# Patient Record
Sex: MALE | Race: WHITE | NOT HISPANIC OR LATINO | URBAN - METROPOLITAN AREA
[De-identification: names, ages, dates, MRNs, and addresses within clinical notes are randomized per-mention and may not be internally consistent; named-entity substitution may affect disease eponyms.]

---

## 2017-02-20 ENCOUNTER — HISTORICAL (OUTPATIENT)
Dept: ADMINISTRATIVE | Facility: HOSPITAL | Age: 72
End: 2017-02-20

## 2019-05-22 ENCOUNTER — HISTORICAL (OUTPATIENT)
Dept: ADMINISTRATIVE | Facility: HOSPITAL | Age: 74
End: 2019-05-22

## 2020-11-18 ENCOUNTER — HISTORICAL (OUTPATIENT)
Dept: ADMINISTRATIVE | Facility: HOSPITAL | Age: 75
End: 2020-11-18

## 2021-04-05 ENCOUNTER — HISTORICAL (OUTPATIENT)
Dept: ADMINISTRATIVE | Facility: HOSPITAL | Age: 76
End: 2021-04-05

## 2021-05-25 ENCOUNTER — HISTORICAL (OUTPATIENT)
Dept: LAB | Facility: HOSPITAL | Age: 76
End: 2021-05-25

## 2021-05-25 ENCOUNTER — HISTORICAL (OUTPATIENT)
Dept: ADMINISTRATIVE | Facility: HOSPITAL | Age: 76
End: 2021-05-25

## 2021-06-21 ENCOUNTER — HISTORICAL (OUTPATIENT)
Dept: ADMINISTRATIVE | Facility: HOSPITAL | Age: 76
End: 2021-06-21

## 2022-04-07 ENCOUNTER — HISTORICAL (OUTPATIENT)
Dept: ADMINISTRATIVE | Facility: HOSPITAL | Age: 77
End: 2022-04-07

## 2022-04-23 VITALS
SYSTOLIC BLOOD PRESSURE: 171 MMHG | WEIGHT: 196.88 LBS | DIASTOLIC BLOOD PRESSURE: 84 MMHG | BODY MASS INDEX: 26.67 KG/M2 | HEIGHT: 72 IN

## 2022-05-01 NOTE — HISTORICAL [facility]
This is a historical note converted from [place]. Formatting and pictures may have been removed.  Please reference Jaye for original formatting and attached multimedia. Chief Complaint  2 wk post op right TKA 5/11/21  History of Present Illness  Patient is 2 weeks postop from right total knee arthroplasty.? He had pain and?swelling?postoperatively?as expected for the past 2 weeks. ?He is not able to take NSAIDs?and?cannot?get comfortable. ?He has no fevers or chills. ?He has no drainage.? He has been?pushing it hard in physical therapy and?he has been to several?extracurricular things such as a rosary as opposed to?icing it. ?He has been on it a lot.  Review of Systems  Systemic: No fever, no chills, and no recent weight change.  Head: No headache - frequent.  Eyes: No vision problems.  Otolarnygeal: No hearing loss, no earache, no epistaxis, no hoarseness, and no tooth pain. Gums normal.  Cardiovascular: No chest pain or discomfort and no palpitations.  Pulmonary: No pulmonary symptoms - difficulty sleeping, no dyspnea, and cough not worse in the morning.  Gastrointestinal: Appetite not decreased. No dysphagia and no constant eructation. No nausea, no vomiting, no abdominal pain, no hematochezia, and no loose/mushy stools - frequent. No constipation - frequent.  Genitourinary: No genitourinary symptoms - Getting up every night to urinate and no increase in urinary frequency. No urinary hesitancy. No urinary loss of control - difficulty stopping urination and no burning sensation during urination.  Musculoskeletal: No calf muscle cramps and no localized soft tissue swelling of the ankle.  Neurological: No fainting and no convulsions.  Psychological: Not feeling nervous tension, not feeling nervous from exhaustion, and no depression.  Skin: No rash. Previous history of no ulcers.  Physical Exam  Vitals & Measurements  T:?36.5? ?C (Oral)? HR:?60(Peripheral)? BP:?145/87?  HT:?182.00?cm? WT:?89.300?kg?  BMI:?26.96?  ACTIVE PROBLEMS  ? Osteoarthritis Localized Primary - Knee right  ?  ?  HISTORY OF PRESENT ILLNESS  ? Feeling as well as can be expected ? No fever ? No chills  ? Knee joint pain ? Knee joint swelling ? Knee joint stiffness  ? No sensory disturbances ? No sleep disturbances  ?  ?  ?  PHYSICAL FINDINGS  Cardiovascular:  Arterial Pulses: ? Dorsalis pedis pulses were normal right  Musculoskeletal System:  Knee:  General/bilateral: ? Swelling of the knee. ? Pain was elicited by motion of the knee. ? No warmth of the knee. ? No instability of the knees. ? Knees demonstrated no muscle weakness.? No erythema.? No evidence of infection.? Incision is healed. ?Staples are removed. ?Steri-Strips applied.  Right Knee:  Knee Motion: Value  Active flexion _ 90degrees  Active extension _0 degrees  ?  Neurological:  Sensation: ? No sensory exam abnormalities were noted.  Motor (Strength): ? No weakness of the knee was observed.  Balance: ? Normal.  Gait And Stance: ? Abnormal.  Skin:  ? No cellulitis.  Wound healing normal. staples C/D/I. Staples removed.  ?  ?  TESTS  Imaging:  X-Ray Knee:  AP and lateral view x-rays of the right knee with sunrise view of the patella were performed -of right knee.  ?  ?  IMPRESSIONS RADIOLOGY TEST  X-ray of knee was performed intact right knee implant and x-ray of knee was performed intact right knee implant.? Pristine interfaces and a stable well aligned right total knee arthroplasty.  Assessment/Plan  1.?Status post total knee replacement, right?Z96.651  ?Staples removed  Steri-Strips applied  I have applied a knee immobilizer today. ?We are going to shut him down from therapy?until?likely on Monday.? He can be full weightbearing as he has been with his cane.? I have instructed him to take it easy?and apply ice?and use the knee immobilizer as well as elevation?to help him feel better.  Ordered:  Post-Op follow-up visit [id number] , Status post total knee replacement, right,  [facility], 05/25/21 16:14:00 CDT  ?  Orders:  diazepam, 5 mg = 1 tab(s), Oral, TID, X 7 day(s), # 21 tab(s), 0 Refill(s), [facility], 182, cm, Height/Length Dosing, 05/25/21 15:37:00 CDT, 89.3, kg, Weight Dosing, 05/25/21 15:37:00 CDT   Problem List/Past Medical History  Ongoing  Arthritis  Arthritis of knee, right  balance issue  barretts esophagus  diabetes  gout  hx of pvcs  Hypertension  Impaired gait and mobility  Osteoarthritis of knees, bilateral  Osteoarthritis of left shoulder  Osteoarthritis of right knee  Rotator cuff arthropathy of right shoulder  stroke  Historical  kidney stones  sleep apnea  Procedure/Surgical History  SHAINA EAGLE Total Knee Arthroplasty (Right) (05/11/2021)  Replacement of Right Knee Joint with Synthetic Substitute, Uncemented, Open Approach (05/11/2021)  Robotic Assisted Procedure of Lower Extremity, Open Approach (05/11/2021)  Arthroscopy of Shoulder with Rotator Cuf (Right) (08/28/2014)  Arthroscopy, shoulder, surgical; with rotator cuff repair. (08/28/2014)  Injection of anesthetic into peripheral nerve for analgesia (08/28/2014)  Injection, anesthetic agent; brachial plexus, single. (08/28/2014)  Rotator cuff repair (08/28/2014)  OTHER IMMOBILIZATION, PRESSURE, AND ATTENTION TO WOUND (09/26/2013)  Physician or other qualified health care professional attendance and supervision of hyperbaric oxygen therapy, per session. (09/26/2013)  Debridement including removal of foreign material at the site of an open fracture and/or an open dislocation (eg, excisional debridement); skin, subcutaneous tissue, muscle fascia, muscle, and bone. (09/24/2013)  Debridement of open fracture of phalanges of hand (09/24/2013)  OTHER IMMOBILIZATION, PRESSURE, AND ATTENTION TO WOUND (09/20/2013)  Physician or other qualified health care professional attendance and supervision of hyperbaric oxygen therapy, per session. (09/20/2013)  OTHER IMMOBILIZATION, PRESSURE, AND ATTENTION  TO WOUND (09/19/2013)  Physician or other qualified health care professional attendance and supervision of hyperbaric oxygen therapy, per session. (09/19/2013)  Physician or other qualified health care professional attendance and supervision of hyperbaric oxygen therapy, per session. (09/19/2013)  OTHER IMMOBILIZATION, PRESSURE, AND ATTENTION TO WOUND (09/18/2013)  Physician or other qualified health care professional attendance and supervision of hyperbaric oxygen therapy, per session. (09/18/2013)  I&D OF OPEN FX OF FINGER WITH HYPERBARICS (2013)  abdominal hernia  back surgery  catract  foot surgery  roght rotator cuff  rt fingers amputated  sinus sx   Medications  acetaminophen-oxycodone 325 mg-10 mg oral tablet, 1 tab(s), Oral, QID  Ecotrin 325 mg oral enteric coated tablet, 325 mg= 1 tab(s), Oral, Daily  LABETALOL  MG TABLET, 200 mg= 1 tab(s), Oral, BID  Pantoprazole 40 mg ORAL EC-Tablet, 40 mg= 1 tab(s), Oral, Daily  polyethylene glycol 3350 oral powder for reconstitution, 17 gm, Oral, Daily  tamsulosin 0.4 mg oral capsule, 0.4 mg= 1 cap(s), Oral, Daily  Trulicity Pen 0.75 mg/0.5 mL subcutaneous solution, 0.75 mg, Subcutaneous, qWeek  Valium 5 mg oral tablet, 5 mg= 1 tab(s), Oral, TID  Allergies  Figs?(rash)  Horse serum proteins specific IgE antibody measurement?(rash)  Social History  Abuse/Neglect  No, 05/25/2021  Alcohol  Current, Daily, 08/17/2014  Employment/School  Employed, 02/20/2017  Home/Environment  Lives with Spouse. Living situation: Home/Independent. Home equipment: Glucose monitoring., 08/17/2014  Nutrition/Health  Diabetic, 08/17/2014  Substance Use - Denies Substance Abuse, 09/23/2013  Never, 02/20/2017  Tobacco - Denies Tobacco Use, 09/23/2013  Never (less than 100 in lifetime), N/A, 05/25/2021  Family History  Family history is unknown  Immunizations  Vaccine Date Status   COVID-19 MRNA, LNP-S, PF- Pfizer 02/08/2021 Recorded   COVID-19 MRNA, LNP-S, PF- Pfizer 01/09/2021 Recorded    Health Maintenance  Health Maintenance  ???Pending?(in the next year)  ??? ??OverDue  ??? ? ? ?Influenza Vaccine due??10/01/20??and every 1??day(s)  ??? ? ? ?Cognitive Screening due??01/02/21??and every 1??year(s)  ??? ??Due?  ??? ? ? ?ADL Screening due??05/25/21??and every 1??year(s)  ??? ? ? ?Diabetes Maintenance-Eye Exam due??05/25/21??Unknown Frequency  ??? ? ? ?Diabetes Maintenance-Fasting Lipid Profile due??05/25/21??Variable frequency  ??? ? ? ?Diabetes Maintenance-Foot Exam due??05/25/21??Unknown Frequency  ??? ? ? ?Diabetes Maintenance-HgbA1c due??05/25/21??Unknown Frequency  ??? ? ? ?Diabetes Maintenance-Microalbumin due??05/25/21??Unknown Frequency  ??? ? ? ?Hypertension Management-Education due??05/25/21??and every 1??year(s)  ??? ? ? ?Medicare Annual Wellness Exam due??05/25/21??and every 1??year(s)  ??? ? ? ?Pneumococcal Vaccine due??05/25/21??Unknown Frequency  ??? ? ? ?Tetanus Vaccine due??05/25/21??and every 10??year(s)  ??? ? ? ?Zoster Vaccine due??05/25/21??Unknown Frequency  ??? ??Due In Future?  ??? ? ? ?Obesity Screening not due until??01/01/22??and every 1??year(s)  ??? ? ? ?Advance Directive not due until??01/02/22??and every 1??year(s)  ??? ? ? ?Fall Risk Assessment not due until??01/02/22??and every 1??year(s)  ??? ? ? ?Functional Assessment not due until??01/02/22??and every 1??year(s)  ??? ? ? ?Depression Screening not due until??04/05/22??and every 1??year(s)  ??? ? ? ?Hypertension Management-BMP not due until??05/13/22??and every 1??year(s)  ??? ? ? ?Aspirin Therapy for CVD Prevention not due until??05/13/22??and every 1??year(s)  ???Satisfied?(in the past 1 year)  ??? ??Satisfied?  ??? ? ? ?Advance Directive on??05/04/21.??Satisfied by Anaya Wilson RN  ??? ? ? ?Aspirin Therapy for CVD Prevention on??05/13/21.??Satisfied by Luigi PUTNAM, Kalyani SHAY  ??? ? ? ?Blood Pressure Screening on??05/25/21.??Satisfied by Tessa Yarbrough LPN  ??? ? ? ?Body Mass Index Check  on??05/25/21.??Satisfied by Tessa Yarbrough LPN.  ??? ? ? ?Coronary Artery Disease Maintenance-Antiplatelet Agent Prescribed on??05/12/21.??Satisfied by Rosalinda Gonzalez NP  ??? ? ? ?Depression Screening on??04/05/21.??Satisfied by Tessa Yarbrough LPN.  ??? ? ? ?Diabetes Maintenance-Serum Creatinine on??05/25/21.??Satisfied by Bowen Han  ??? ? ? ?Diabetes Maintenance-HgbA1c on??04/05/21.??Satisfied by Rangel Augustine  ??? ? ? ?Diabetes Screening on??05/25/21.??Satisfied by Bowen Han  ??? ? ? ?Fall Risk Assessment on??05/25/21.??Satisfied by Tessa Yarbrough LPN  ??? ? ? ?Functional Assessment on??05/11/21.??Satisfied by Deep PUTNAM, Liz  ??? ? ? ?Hypertension Management-BMP on??05/25/21.??Satisfied by Bowen Han  ??? ? ? ?Hypertension Management-Blood Pressure on??05/25/21.??Satisfied by Tessa Yarbrough LPN  ??? ? ? ?Obesity Screening on??05/25/21.??Satisfied by Tessa Yarbrough LPN  ?

## 2022-05-01 NOTE — HISTORICAL [facility]
This is a historical note converted from [place]. Formatting and pictures may have been removed.  Please reference [place] for original formatting and attached multimedia. Chief Complaint  PT HERE FOR B/L KNEE PAIN, RT KNEE IS WORSE...X-RAY TODAY...CV  History of Present Illness  Randolph Gerard is here for evaluation of his right and left knees.? His pain in the medial aspect of both knees. ?Is occasional giving way when it hurts.  Review of Systems  Systemic: No fever, no chills, and no recent weight change.  Head: No headache - frequent.  Eyes: No vision problems.  Otolarnygeal: No hearing loss, no earache, no epistaxis, no hoarseness, and no tooth pain. Gums normal.  Cardiovascular: No chest pain or discomfort and no palpitations.  Pulmonary: No pulmonary symptoms - difficulty sleeping, no dyspnea, and cough not worse in the morning.  Gastrointestinal: Appetite not decreased. No dysphagia and no constant eructation. No nausea, no vomiting, no abdominal pain, no hematochezia, and no loose/mushy stools - frequent. No constipation - frequent.  Genitourinary: No genitourinary symptoms - Getting up every night to urinate and no increase in urinary frequency. No urinary hesitancy. No urinary loss of control - difficulty stopping urination and no burning sensation during urination.  Musculoskeletal: No calf muscle cramps and no localized soft tissue swelling of the ankle.  Neurological: No fainting and no convulsions.  Psychological: Not feeling nervous tension, not feeling nervous from exhaustion, and no depression.  Skin: No rash. Previous history of no ulcers.  Physical Exam  Vitals & Measurements  T:?97.2? ?F (Oral)? HR:?74(Peripheral)? BP:?139/88?  HT:?185?cm? WT:?89.35?kg? BMI:?26.11?  Right knee exam:  PHYSICAL FINDINGS  Cardiovascular:  Arterial Pulses: Posterior tibialis pulses were normal right. Dorsalis pedis pulses were normal right.  Musculoskeletal System:  Thigh:  Right Thigh: Thigh showed quadriceps  atrophy.  Knee:  Right Knee: Examined.  Knee:  Grade in the knee: Value  Grade effusion 1  Genu varum. Patella demonstrated crepitus. Anteromedial aspect was tender on palpation. Medial aspect was tender on palpation. Medial collateral ligament was tender on palpation. Active motion.  Right Knee:  Right Knee Motion: Value  Active flexion _130 degrees  Active extension _0 degrees  Pain was elicited by flexion. No erythema. No warmth. No medial instability. No lateral instability. No one plane medial (straight) instability. No one plane lateral (straight) instability. A Lachman test did not demonstrate one plane anterior instability.  Neurological:  Gait And Stance: A right-sided antalgic gait was observed.  ?  ?  TESTS  Imaging:  X-Ray Knee:  A complete knee x-ray with standing views was performed -of right knee.  AP and lateral view x-rays of the right knee with sunrise view of the patella were performed -of right knee.  ?  ?  IMPRESSIONS RADIOLOGY TEST  Tricompartmental osteophytes.? Moderate?cartilage space narrowing of the medial compartment.  ?   ???  ???  Using sterile techniques after informed verbal consent. Risk discussed with patient prior to injection  ?   Left knee exam:  PHYSICAL FINDINGS  Cardiovascular:  Arterial Pulses: Posterior tibialis pulses were normal left. Dorsalis pedis pulses were normal left.  Musculoskeletal System:  Thigh:  Left Thigh: Thigh showed quadriceps atrophy.  Knee:  Left Knee: Examined.  Knee:  Grade in the knee: Value  Grade effusion 1  Genu varum. Patella demonstrated crepitus. Anteromedial aspect was tender on palpation. Medial aspect was tender on palpation. Medial collateral ligament was tender on palpation. Active motion.  Left Knee:  Left Knee Motion: Value  Active flexion _130 degrees  Active extension 0_ degrees  Pain was elicited by flexion. No erythema. No warmth. No medial instability. No lateral instability. No one plane medial (straight) instability. No one plane  lateral (straight) instability. A Lachman test did not demonstrate one plane anterior instability.  Neurological:  Gait And Stance: A left-sided antalgic gait was observed.  ?  ?   TESTS  Imaging:  X-Ray Knee:  A complete knee x-ray with standing views was performed -of left knee.  AP and lateral view x-rays of the left knee with sunrise view of the patella were performed -of left knee.  ?  ?   IMPRESSIONS RADIOLOGY TEST  Left knee tricompartmental osteophytes and moderate cartilage space narrowing of the medial compartment left knee joint  ???  ???  Using sterile techniques after informed verbal consent. Risk discussed with patient prior to injection  Assessment/Plan  1.?Osteoarthritis of knees, bilateral?M17.0  Ordered:  betamethasone, 24 mg, Intra-Articular, Once, first dose 05/22/19 11:00:00 CDT, stop date 05/22/19 11:00:00 CDT  Asp/Inj (Bilateral) Major Jnt/Bursa [id number], 05/22/19 10:30:00 CDT, [facility], Routine, 05/22/19 10:30:00 CDT  Office/Outpatient Visit Level 3 Established [id number] PC, Osteoarthritis of knees, bilateral, [facility], 05/22/19 10:30:00 CDT  ?  Orders:  Clinic Follow-up PRN, 05/22/19 10:30:00 CDT, Future Order, [facility]  XR Knee Left 4 or More Views, Routine, 05/22/19 9:53:00 CDT, Pain, None, Stretcher, Patient Has IV?, Rad Type, Bilateral knee pain, 05/22/19 9:53:00 CDT  XR Knee Right 4 or More Views, Routine, 05/22/19 9:52:00 CDT, Pain, None, Stretcher, Patient Has IV?, Rad Type, Bilateral knee pain, 05/22/19 9:52:00 CDT  XR Shoulder Right Minimum 2 Views, Routine, 05/22/19 9:52:00 CDT, Pain, None, Stretcher, Patient Has IV?, Rad Type, Right shoulder pain, 05/22/19 9:52:00 CDT  Referrals  Clinic Follow-up PRN, 05/22/19 10:30:00 CDT, Future Order, [facility]   Problem List/Past Medical History  Ongoing  Arthritis  balance issue  barretts esophagus  diabetes  gout  hx of pvcs  Hypertension  Osteoarthritis of knees, bilateral  stroke  Historical  kidney  stones  sleep apnea  Procedure/Surgical History  Arthroscopy of Shoulder with Rotator Cuf (Right) (08/28/2014)  Arthroscopy, shoulder, surgical; with rotator cuff repair. (08/28/2014)  Injection of anesthetic into peripheral nerve for analgesia (08/28/2014)  Injection, anesthetic agent; brachial plexus, single. (08/28/2014)  Rotator cuff repair (08/28/2014)  OTHER IMMOBILIZATION, PRESSURE, AND ATTENTION TO WOUND (09/26/2013)  Physician or other qualified health care professional attendance and supervision of hyperbaric oxygen therapy, per session. (09/26/2013)  Debridement including removal of foreign material at the site of an open fracture and/or an open dislocation (eg, excisional debridement); skin, subcutaneous tissue, muscle fascia, muscle, and bone. (09/24/2013)  Debridement of open fracture of phalanges of hand (09/24/2013)  OTHER IMMOBILIZATION, PRESSURE, AND ATTENTION TO WOUND (09/20/2013)  Physician or other qualified health care professional attendance and supervision of hyperbaric oxygen therapy, per session. (09/20/2013)  OTHER IMMOBILIZATION, PRESSURE, AND ATTENTION TO WOUND (09/19/2013)  Physician or other qualified health care professional attendance and supervision of hyperbaric oxygen therapy, per session. (09/19/2013)  Physician or other qualified health care professional attendance and supervision of hyperbaric oxygen therapy, per session. (09/19/2013)  OTHER IMMOBILIZATION, PRESSURE, AND ATTENTION TO WOUND (09/18/2013)  Physician or other qualified health care professional attendance and supervision of hyperbaric oxygen therapy, per session. (09/18/2013)  I&D OF OPEN FX OF FINGER WITH HYPERBARICS (2013)  abdominal hernia  back surgery  foot surgery  roght rotator cuff  rt fingers amputated  sinus sx   Medications  Allegra 60 mg oral tablet, 60 mg= 1 tab(s), Oral, Daily,? ?Not Taking, Completed Rx: done  allopurinol 300 mg oral tablet, 300 mg= 1 tab(s), Oral, Daily  Celebrex 200 mg oral capsule,  200 mg= 1 cap(s), Oral, Daily  Celestone, 24 mg, Intra-Articular, Once  CHLORTHALIDONE 25 MG TABLET, 25 mg= 1 tab(s), Oral, Daily  Diovan  mg-12.5 mg oral tablet, 1 tab(s), Oral, Daily,? ?Not Taking, Completed Rx: Last Dose Date/Time Unknown  LABETALOL  MG TABLET, 200 mg= 1 tab(s), Oral, BID  LANSOPRAZOLE DR 30 MG CAPSULE, 30 mg= 1 cap(s), Oral, Daily,? ?Not Taking, Completed Rx: done  METFORMIN  MG TABLET,? ?Not Taking, Completed Rx: done  multivitamin with minerals (Adult Tab), See Instructions  Nexium 40 mg oral delayed release capsule (pt. own), 40 mg= 1 cap(s), Oral, Daily,? ?Not Taking, Completed Rx: done  ONGLYZA 5 MG TABLET, 5 mg= 1 tab(s), Oral, Daily,? ?Not Taking, Completed Rx: done  Plavix 75 mg oral tablet, 75 mg= 1 tab(s), Oral, Daily,? ?Not Taking, Completed Rx: done  probenecid 500 mg oral tablet, 1 tab, Oral, Daily,? ?Not Taking, Completed Rx: done  Ziac 5 mg-6.25 mg oral tablet, 1 tab(s), Oral, Daily,? ?Not Taking, Completed Rx: done  Allergies  Figs?(rash)  Horse serum proteins specific IgE antibody measurement?(rash)  Social History  Alcohol  Current, Daily, 08/17/2014  Employment/School  Employed, 02/20/2017  Home/Environment  Lives with Spouse. Living situation: Home/Independent. Home equipment: Glucose monitoring., 08/17/2014  Nutrition/Health  Diabetic, 08/17/2014  Substance Abuse - Denies Substance Abuse, 09/23/2013  Never, 02/20/2017  Tobacco - Denies Tobacco Use, 09/23/2013  Never (less than 100 in lifetime), N/A, 05/22/2019  Family History  Family history is unknown  Health Maintenance  Health Maintenance  ???Pending?(in the next year)  ??? ??OverDue  ??? ? ? ?Diabetes Maintenance-Serum Creatinine due??08/27/15??and every 1??year(s)  ??? ? ? ?Advance Directive due??01/01/19??and every 1??year(s)  ??? ? ? ?Cognitive Screening due??01/01/19??and every 1??year(s)  ??? ? ? ?Geriatric Depression Screening due??01/01/19??and every 1??year(s)  ??? ??Due?  ??? ? ? ?ADL  Screening due??05/22/19??and every 1??year(s)  ??? ? ? ?Aspirin Therapy for CVD Prevention due??05/22/19??and every 1??year(s)  ??? ? ? ?Diabetes Maintenance-Microalbumin due??05/22/19??Variable frequency  ??? ? ? ?Diabetes Maintenance-Urine Dipstick due??05/22/19??Variable frequency  ??? ? ? ?Hypertension Management-Education due??05/22/19??and every 1??year(s)  ??? ? ? ?Pneumococcal Vaccine due??05/22/19??Variable frequency  ??? ? ? ?Tetanus Vaccine due??05/22/19??and every 10??year(s)  ??? ? ? ?Zoster Vaccine due??05/22/19??and every 100??year(s)  ??? ??Due In Future?  ??? ? ? ?Fall Risk Assessment not due until??01/01/20??and every 1??year(s)  ??? ? ? ?Functional Assessment not due until??01/01/20??and every 1??year(s)  ??? ? ? ?Obesity Screening not due until??01/01/20??and every 1??year(s)  ???Satisfied?(in the past 1 year)  ??? ??Satisfied?  ??? ? ? ?Blood Pressure Screening on??05/22/19.??Satisfied by Manisha Choudhury LPN  ??? ? ? ?Body Mass Index Check on??05/22/19.??Satisfied by Manisha Choudhury LPN  ??? ? ? ?Fall Risk Assessment on??05/22/19.??Satisfied by Manisha Choudhury LPN  ??? ? ? ?Functional Assessment on??05/22/19.??Satisfied by Manisha Choudhury LPN  ??? ? ? ?Hypertension Management-Blood Pressure on??05/22/19.??Satisfied by Manisha Choudhury LPN  ??? ? ? ?Obesity Screening on??05/22/19.??Satisfied by Kei MCGILL, Manisha Ann  ?  ?

## 2022-05-01 NOTE — HISTORICAL [facility]
This is a historical note converted from Jaye. Formatting and pictures may have been removed.  Please reference Jaye for original formatting and attached multimedia. Chief Complaint  R total knee, global 5/11/21 Pt complains of quad pain and lateral knee pain.  History of Present Illness  Doing well 6 weeks postop from?right total knee arthroplasty.? Advancing nicely in physical therapy. ?Pain has dramatically improved. ?He takes?only Tylenol periodically when it hurts. ?Some days he goes with nothing at all.? He is back working in his yard?and wants to go back to work in his office.  Review of Systems  Systemic: No fever, no chills, weight loss of 18 pounds?due to eating better.? Patient feels much better.  Head: No headache - frequent.  Eyes: No vision problems.  Otolarnygeal: No hearing loss, no earache, no epistaxis, no hoarseness, and no tooth pain. Gums normal.  Cardiovascular: No chest pain or discomfort and no palpitations.  Pulmonary: No pulmonary symptoms - difficulty sleeping, no dyspnea, and cough not worse in the morning.  Gastrointestinal: Appetite not decreased. No dysphagia and no constant eructation. No nausea, no vomiting, no abdominal pain, no hematochezia, and no loose/mushy stools - frequent. No constipation - frequent.  Genitourinary: No genitourinary symptoms - Getting up every night to urinate and no increase in urinary frequency. No urinary hesitancy. No urinary loss of control - difficulty stopping urination and no burning sensation during urination.  Musculoskeletal: No calf muscle cramps and no localized soft tissue swelling of the ankle.  Neurological: No fainting and no convulsions.  Psychological: Not feeling nervous tension, not feeling nervous from exhaustion, and no depression.  Skin: No rash. Previous history of no ulcers.  Physical Exam  Vitals & Measurements  T:?36.2? ?C (Oral)? HR:?80(Peripheral)? BP:?147/83?  HT:?182.00?cm? WT:?89.300?kg?  BMI:?26.96?  ?  Cardiovascular:  Arterial Pulses: ? Dorsalis pedis pulses were normal.  Musculoskeletal System:  Right Knee:  General: ? No swelling of the knee. ? No warmth of the knee. ? No pain was elicited by motion of the knee. ? No instability of the knee. ? Knees demonstrated no muscle weakness.  Right Knee: ? Motion was normal.  Active flexion 120_ degrees  Active extension 0 degrees  Neurological:  Sensation: ??Normal.  Motor (Strength): ? No weakness of the right knee was observed.? Normal gait  Skin:  ? No cellulitis. Surgical incision well healed ?  Tests  Imaging:  X-Ray Knee:  A complete knee x-ray with standing views was performed -of right knee.  Impressions Radiology Test  X-ray of knee was performed intact right knee implant.? Pristine interfaces and a stable well aligned right total knee arthroplasty  ?  ?  ?  Assessment/Plan  1.?Status post total knee replacement, right?Z96.651  ?PT for 1 more month?then transition to home exercise program  Ordered:  Clinic Follow up, *Est. 08/21/21 3:00:00 CDT, Order for future visit, Status post total knee replacement, right, [facility]  Post-Op follow-up visit [id number] PC, Status post total knee replacement, right, [facility], 06/21/21 10:45:00 CDT  PT/OT External Referral, 06/21/21 10:20:00 CDT, Status post total knee replacement, right, Anit Grav Hip Abductor & Extensor Exc.  Isotonic hamstring & Closed Chain Quad  Therapeutic Excercise  Stretch and Strengthen  No Dry Needling, AROM/AAROM/PROM/FWB, 3 X Week, Patien...  XR Knee Right 3 Views, Routine, 06/21/21 9:39:00 CDT, None, Stretcher, Patient Has IV?, Rad Type, Status post total knee replacement, right, Not Scheduled, 06/21/21 9:39:00 CDT  ?  Referrals  Clinic Follow up, *Est. 08/21/21 3:00:00 CDT, Order for future visit, Status post total knee replacement, right, [facility]  PT/OT External Referral, 06/21/21 10:20:00 CDT, Status post total knee replacement, right, Anit Grav Hip  Abductor & Extensor Exc.  Isotonic hamstring & Closed Chain Quad  Therapeutic Excercise  Stretch and Strengthen  No Dry Needling, AROM/AAROM/PROM/FWB, 3 X Week, Patien...   Problem List/Past Medical History  Ongoing  Arthritis  Arthritis of knee, right  balance issue  barretts esophagus  diabetes  gout  hx of pvcs  Hypertension  Impaired gait and mobility  Osteoarthritis of knees, bilateral  Osteoarthritis of left shoulder  Osteoarthritis of right knee  Rotator cuff arthropathy of right shoulder  stroke  Historical  kidney stones  sleep apnea  Procedure/Surgical History  SHAINA EAGLE Total Knee Arthroplasty (Right) (05/11/2021)  Replacement of Right Knee Joint with Synthetic Substitute, Uncemented, Open Approach (05/11/2021)  Robotic Assisted Procedure of Lower Extremity, Open Approach (05/11/2021)  Arthroscopy of Shoulder with Rotator Cuf (Right) (08/28/2014)  Arthroscopy, shoulder, surgical; with rotator cuff repair. (08/28/2014)  Injection of anesthetic into peripheral nerve for analgesia (08/28/2014)  Injection, anesthetic agent; brachial plexus, single. (08/28/2014)  Rotator cuff repair (08/28/2014)  OTHER IMMOBILIZATION, PRESSURE, AND ATTENTION TO WOUND (09/26/2013)  Physician or other qualified health care professional attendance and supervision of hyperbaric oxygen therapy, per session. (09/26/2013)  Debridement including removal of foreign material at the site of an open fracture and/or an open dislocation (eg, excisional debridement); skin, subcutaneous tissue, muscle fascia, muscle, and bone. (09/24/2013)  Debridement of open fracture of phalanges of hand (09/24/2013)  OTHER IMMOBILIZATION, PRESSURE, AND ATTENTION TO WOUND (09/20/2013)  Physician or other qualified health care professional attendance and supervision of hyperbaric oxygen therapy, per session. (09/20/2013)  OTHER IMMOBILIZATION, PRESSURE, AND ATTENTION TO WOUND (09/19/2013)  Physician or other qualified health care professional attendance  and supervision of hyperbaric oxygen therapy, per session. (09/19/2013)  Physician or other qualified health care professional attendance and supervision of hyperbaric oxygen therapy, per session. (09/19/2013)  OTHER IMMOBILIZATION, PRESSURE, AND ATTENTION TO WOUND (09/18/2013)  Physician or other qualified health care professional attendance and supervision of hyperbaric oxygen therapy, per session. (09/18/2013)  I&D OF OPEN FX OF FINGER WITH HYPERBARICS (2013)  abdominal hernia  back surgery  catract  foot surgery  roght rotator cuff  rt fingers amputated  sinus sx   Medications  acetaminophen-oxycodone 325 mg-10 mg oral tablet, 1 tab(s), Oral, QID,? ?Not taking  Ecotrin 325 mg oral enteric coated tablet, 325 mg= 1 tab(s), Oral, Daily  LABETALOL  MG TABLET, 200 mg= 1 tab(s), Oral, BID  Pantoprazole 40 mg ORAL EC-Tablet, 40 mg= 1 tab(s), Oral, Daily  tamsulosin 0.4 mg oral capsule, 0.4 mg= 1 cap(s), Oral, Daily  Trulicity Pen 0.75 mg/0.5 mL subcutaneous solution, 0.75 mg, Subcutaneous, qWeek  Allergies  Figs?(rash)  Horse serum proteins specific IgE antibody measurement?(rash)  Social History  Abuse/Neglect  No, 05/25/2021  Alcohol  Current, Daily, 08/17/2014  Employment/School  Employed, 02/20/2017  Home/Environment  Lives with Spouse. Living situation: Home/Independent. Home equipment: Glucose monitoring., 08/17/2014  Nutrition/Health  Diabetic, 08/17/2014  Substance Use - Denies Substance Abuse, 09/23/2013  Never, 02/20/2017  Tobacco - Denies Tobacco Use, 09/23/2013  Never (less than 100 in lifetime), N/A, 05/25/2021  Family History  Family history is unknown  Immunizations  Vaccine Date Status   COVID-19 MRNA, LNP-S, PF- Pfizer 02/08/2021 Recorded   COVID-19 MRNA, LNP-S, PF- Pfizer 01/09/2021 Recorded   Health Maintenance  Health Maintenance  ???Pending?(in the next year)  ??? ??OverDue  ??? ? ? ?Influenza Vaccine due??10/01/20??and every 1??day(s)  ??? ? ? ?Cognitive Screening due??01/02/21??and every  1??year(s)  ??? ??Due?  ??? ? ? ?ADL Screening due??06/21/21??and every 1??year(s)  ??? ? ? ?Diabetes Maintenance-Eye Exam due??06/21/21??Unknown Frequency  ??? ? ? ?Diabetes Maintenance-Fasting Lipid Profile due??06/21/21??Variable frequency  ??? ? ? ?Diabetes Maintenance-Foot Exam due??06/21/21??Unknown Frequency  ??? ? ? ?Diabetes Maintenance-HgbA1c due??06/21/21??Unknown Frequency  ??? ? ? ?Diabetes Maintenance-Microalbumin due??06/21/21??Unknown Frequency  ??? ? ? ?Hypertension Management-Education due??06/21/21??and every 1??year(s)  ??? ? ? ?Medicare Annual Wellness Exam due??06/21/21??and every 1??year(s)  ??? ? ? ?Pneumococcal Vaccine due??06/21/21??Unknown Frequency  ??? ? ? ?Tetanus Vaccine due??06/21/21??and every 10??year(s)  ??? ? ? ?Zoster Vaccine due??06/21/21??Unknown Frequency  ??? ??Due In Future?  ??? ? ? ?Obesity Screening not due until??01/01/22??and every 1??year(s)  ??? ? ? ?Advance Directive not due until??01/02/22??and every 1??year(s)  ??? ? ? ?Fall Risk Assessment not due until??01/02/22??and every 1??year(s)  ??? ? ? ?Functional Assessment not due until??01/02/22??and every 1??year(s)  ??? ? ? ?Aspirin Therapy for CVD Prevention not due until??05/13/22??and every 1??year(s)  ??? ? ? ?Hypertension Management-BMP not due until??05/25/22??and every 1??year(s)  ??? ? ? ?Diabetes Maintenance-Serum Creatinine not due until??05/25/22??and every 1??year(s)  ???Satisfied?(in the past 1 year)  ??? ??Satisfied?  ??? ? ? ?Advance Directive on??05/04/21.??Satisfied by Anaya Wilson RN.  ??? ? ? ?Aspirin Therapy for CVD Prevention on??05/13/21.??Satisfied by Kalyani Meyers RN  ??? ? ? ?Blood Pressure Screening on??06/21/21.??Satisfied by Tessa Yarbrough LPN  ??? ? ? ?Body Mass Index Check on??06/21/21.??Satisfied by Tessa Yarbrough LPN.  ??? ? ? ?Coronary Artery Disease Maintenance-Antiplatelet Agent Prescribed on??05/12/21.??Satisfied by Rosalinda Gonzalez NP  ??? ? ? ?Depression  Screening on??06/21/21.??Satisfied by Tessa Yarbrough LPN.  ??? ? ? ?Diabetes Maintenance-Serum Creatinine on??05/25/21.??Satisfied by Bowen Han  ??? ? ? ?Diabetes Maintenance-HgbA1c on??04/05/21.??Satisfied by Rangel Augustine  ??? ? ? ?Diabetes Screening on??05/25/21.??Satisfied by Bowen Han  ??? ? ? ?Fall Risk Assessment on??06/21/21.??Satisfied by Tessa Yarrbough LPN.  ??? ? ? ?Functional Assessment on??05/11/21.??Satisfied by Deep PUTNAM, Liz  ??? ? ? ?Hypertension Management-Blood Pressure on??06/21/21.??Satisfied by Tessa Yarbrough LPN.  ??? ? ? ?Hypertension Management-BMP on??05/25/21.??Satisfied by Bowen Han  ??? ? ? ?Obesity Screening on??06/21/21.??Satisfied by Tessa Yarbrough LPN.  ?

## 2022-05-01 NOTE — HISTORICAL [facility]
This is a historical note converted from [place]. Formatting and pictures may have been removed.  Please reference Jaye for original formatting and attached multimedia. Chief Complaint  2 wk post op right TKA 5/11/21  History of Present Illness  Patient is 2 weeks postop from right total knee arthroplasty.? He had pain and?swelling?postoperatively?as expected for the past 2 weeks. ?He is not able to take NSAIDs?and?cannot?get comfortable. ?He has no fevers or chills. ?He has no drainage.? He has been?pushing it hard in physical therapy and?he has been to several?extracurricular things such as a rosary as opposed to?icing it. ?He has been on it a lot.  Review of Systems  Systemic: No fever, no chills, and no recent weight change.  Head: No headache - frequent.  Eyes: No vision problems.  Otolarnygeal: No hearing loss, no earache, no epistaxis, no hoarseness, and no tooth pain. Gums normal.  Cardiovascular: No chest pain or discomfort and no palpitations.  Pulmonary: No pulmonary symptoms - difficulty sleeping, no dyspnea, and cough not worse in the morning.  Gastrointestinal: Appetite not decreased. No dysphagia and no constant eructation. No nausea, no vomiting, no abdominal pain, no hematochezia, and no loose/mushy stools - frequent. No constipation - frequent.  Genitourinary: No genitourinary symptoms - Getting up every night to urinate and no increase in urinary frequency. No urinary hesitancy. No urinary loss of control - difficulty stopping urination and no burning sensation during urination.  Musculoskeletal: No calf muscle cramps and no localized soft tissue swelling of the ankle.  Neurological: No fainting and no convulsions.  Psychological: Not feeling nervous tension, not feeling nervous from exhaustion, and no depression.  Skin: No rash. Previous history of no ulcers.  Physical Exam  Vitals & Measurements  T:?36.5? ?C (Oral)? HR:?60(Peripheral)? BP:?145/87?  HT:?182.00?cm? WT:?89.300?kg?  BMI:?26.96?  ACTIVE PROBLEMS  ? Osteoarthritis Localized Primary - Knee right  ?  ?  HISTORY OF PRESENT ILLNESS  ? Feeling as well as can be expected ? No fever ? No chills  ? Knee joint pain ? Knee joint swelling ? Knee joint stiffness  ? No sensory disturbances ? No sleep disturbances  ?  ?  ?  PHYSICAL FINDINGS  Cardiovascular:  Arterial Pulses: ? Dorsalis pedis pulses were normal right  Musculoskeletal System:  Knee:  General/bilateral: ? Swelling of the knee. ? Pain was elicited by motion of the knee. ? No warmth of the knee. ? No instability of the knees. ? Knees demonstrated no muscle weakness.? No erythema.? No evidence of infection.? Incision is healed. ?Staples are removed. ?Steri-Strips applied.  Right Knee:  Knee Motion: Value  Active flexion _ 90degrees  Active extension _0 degrees  ?  Neurological:  Sensation: ? No sensory exam abnormalities were noted.  Motor (Strength): ? No weakness of the knee was observed.  Balance: ? Normal.  Gait And Stance: ? Abnormal.  Skin:  ? No cellulitis.  Wound healing normal. staples C/D/I. Staples removed.  ?  ?  TESTS  Imaging:  X-Ray Knee:  AP and lateral view x-rays of the right knee with sunrise view of the patella were performed -of right knee.  ?  ?  IMPRESSIONS RADIOLOGY TEST  X-ray of knee was performed intact right knee implant and x-ray of knee was performed intact right knee implant.? Pristine interfaces and a stable well aligned right total knee arthroplasty.  Assessment/Plan  1.?Status post total knee replacement, right?Z96.651  ?I have instructed him in the necessity for rest.? We did stop physical therapy until?next week to allow for this. ?I placed him in a knee immobilizer.? He can be full weightbearing as he has been with his cane.? I have instructed him and ice and elevation.? His creatinine is 1.6?because of his?chronic renal insufficiency?but will use an adjunctive NSAID?for few days?to cut down on his?inflammation?and pain.? We have discontinue the  Robaxin and placed him on?Valium 5 mg p.o. 2 or 3 times daily?for muscle spasm?and he has been switched to?Percocet?4 times daily for pain.  Ordered:  Post-Op follow-up visit [id number] PC, Status post total knee replacement, right, [facility], 05/25/21 16:14:00 CDT  ?  Orders:  diazepam, 5 mg = 1 tab(s), Oral, TID, X 7 day(s), # 21 tab(s), 0 Refill(s), Pharmacy: [facility], 182, cm, Height/Length Dosing, 05/25/21 15:37:00 CDT, 89.3, kg, Weight Dosing, 05/25/21 15:37:00 CDT   Problem List/Past Medical History  Ongoing  Arthritis  Arthritis of knee, right  balance issue  barretts esophagus  diabetes  gout  hx of pvcs  Hypertension  Impaired gait and mobility  Osteoarthritis of knees, bilateral  Osteoarthritis of left shoulder  Osteoarthritis of right knee  Rotator cuff arthropathy of right shoulder  stroke  Historical  kidney stones  sleep apnea  Procedure/Surgical History  SHAINA EAGLE Total Knee Arthroplasty (Right) (05/11/2021)  Replacement of Right Knee Joint with Synthetic Substitute, Uncemented, Open Approach (05/11/2021)  Robotic Assisted Procedure of Lower Extremity, Open Approach (05/11/2021)  Arthroscopy of Shoulder with Rotator Cuf (Right) (08/28/2014)  Arthroscopy, shoulder, surgical; with rotator cuff repair. (08/28/2014)  Injection of anesthetic into peripheral nerve for analgesia (08/28/2014)  Injection, anesthetic agent; brachial plexus, single. (08/28/2014)  Rotator cuff repair (08/28/2014)  OTHER IMMOBILIZATION, PRESSURE, AND ATTENTION TO WOUND (09/26/2013)  Physician or other qualified health care professional attendance and supervision of hyperbaric oxygen therapy, per session. (09/26/2013)  Debridement including removal of foreign material at the site of an open fracture and/or an open dislocation (eg, excisional debridement); skin, subcutaneous tissue, muscle fascia, muscle, and bone. (09/24/2013)  Debridement of open fracture of phalanges of hand (09/24/2013)  OTHER IMMOBILIZATION,  PRESSURE, AND ATTENTION TO WOUND (09/20/2013)  Physician or other qualified health care professional attendance and supervision of hyperbaric oxygen therapy, per session. (09/20/2013)  OTHER IMMOBILIZATION, PRESSURE, AND ATTENTION TO WOUND (09/19/2013)  Physician or other qualified health care professional attendance and supervision of hyperbaric oxygen therapy, per session. (09/19/2013)  Physician or other qualified health care professional attendance and supervision of hyperbaric oxygen therapy, per session. (09/19/2013)  OTHER IMMOBILIZATION, PRESSURE, AND ATTENTION TO WOUND (09/18/2013)  Physician or other qualified health care professional attendance and supervision of hyperbaric oxygen therapy, per session. (09/18/2013)  I&D OF OPEN FX OF FINGER WITH HYPERBARICS (2013)  abdominal hernia  back surgery  catract  foot surgery  roght rotator cuff  rt fingers amputated  sinus sx   Medications  acetaminophen-oxycodone 325 mg-10 mg oral tablet, 1 tab(s), Oral, QID  Ecotrin 325 mg oral enteric coated tablet, 325 mg= 1 tab(s), Oral, Daily  LABETALOL  MG TABLET, 200 mg= 1 tab(s), Oral, BID  Pantoprazole 40 mg ORAL EC-Tablet, 40 mg= 1 tab(s), Oral, Daily  polyethylene glycol 3350 oral powder for reconstitution, 17 gm, Oral, Daily  tamsulosin 0.4 mg oral capsule, 0.4 mg= 1 cap(s), Oral, Daily  Trulicity Pen 0.75 mg/0.5 mL subcutaneous solution, 0.75 mg, Subcutaneous, qWeek  Valium 5 mg oral tablet, 5 mg= 1 tab(s), Oral, TID  Allergies  Figs?(rash)  Horse serum proteins specific IgE antibody measurement?(rash)  Social History  Abuse/Neglect  No, 05/25/2021  Alcohol  Current, Daily, 08/17/2014  Employment/School  Employed, 02/20/2017  Home/Environment  Lives with Spouse. Living situation: Home/Independent. Home equipment: Glucose monitoring., 08/17/2014  Nutrition/Health  Diabetic, 08/17/2014  Substance Use - Denies Substance Abuse, 09/23/2013  Never, 02/20/2017  Tobacco - Denies Tobacco Use, 09/23/2013  Never (less  than 100 in lifetime), N/A, 05/25/2021  Family History  Family history is unknown  Immunizations  Vaccine Date Status   COVID-19 MRNA, LNP-S, PF- Pfizer 02/08/2021 Recorded   COVID-19 MRNA, LNP-S, PF- Pfizer 01/09/2021 Recorded   Health Maintenance  Health Maintenance  ???Pending?(in the next year)  ??? ??OverDue  ??? ? ? ?Influenza Vaccine due??10/01/20??and every 1??day(s)  ??? ? ? ?Cognitive Screening due??01/02/21??and every 1??year(s)  ??? ??Due?  ??? ? ? ?ADL Screening due??05/25/21??and every 1??year(s)  ??? ? ? ?Diabetes Maintenance-Eye Exam due??05/25/21??Unknown Frequency  ??? ? ? ?Diabetes Maintenance-Fasting Lipid Profile due??05/25/21??Variable frequency  ??? ? ? ?Diabetes Maintenance-Foot Exam due??05/25/21??Unknown Frequency  ??? ? ? ?Diabetes Maintenance-HgbA1c due??05/25/21??Unknown Frequency  ??? ? ? ?Diabetes Maintenance-Microalbumin due??05/25/21??Unknown Frequency  ??? ? ? ?Hypertension Management-Education due??05/25/21??and every 1??year(s)  ??? ? ? ?Medicare Annual Wellness Exam due??05/25/21??and every 1??year(s)  ??? ? ? ?Pneumococcal Vaccine due??05/25/21??Unknown Frequency  ??? ? ? ?Tetanus Vaccine due??05/25/21??and every 10??year(s)  ??? ? ? ?Zoster Vaccine due??05/25/21??Unknown Frequency  ??? ??Due In Future?  ??? ? ? ?Obesity Screening not due until??01/01/22??and every 1??year(s)  ??? ? ? ?Advance Directive not due until??01/02/22??and every 1??year(s)  ??? ? ? ?Fall Risk Assessment not due until??01/02/22??and every 1??year(s)  ??? ? ? ?Functional Assessment not due until??01/02/22??and every 1??year(s)  ??? ? ? ?Depression Screening not due until??04/05/22??and every 1??year(s)  ??? ? ? ?Aspirin Therapy for CVD Prevention not due until??05/13/22??and every 1??year(s)  ???Satisfied?(in the past 1 year)  ??? ??Satisfied?  ??? ? ? ?Advance Directive on??05/04/21.??Satisfied by Anaya Wilson RN  ??? ? ? ?Aspirin Therapy for CVD Prevention on??05/13/21.??Satisfied by Luigi  JERSEY, Kalyani SHAY  ??? ? ? ?Blood Pressure Screening on??05/25/21.??Satisfied by Tessa Yarbrough LPN.  ??? ? ? ?Body Mass Index Check on??05/25/21.??Satisfied by Tessa Yarbrough LPN.  ??? ? ? ?Coronary Artery Disease Maintenance-Antiplatelet Agent Prescribed on??05/12/21.??Satisfied by Carlos PADGETT, Rosalinda L  ??? ? ? ?Depression Screening on??04/05/21.??Satisfied by Tessa Yarbrough LPN.  ??? ? ? ?Diabetes Maintenance-Serum Creatinine on??05/25/21.??Satisfied by Bowen Han  ??? ? ? ?Diabetes Maintenance-HgbA1c on??04/05/21.??Satisfied by Rangel Augustine  ??? ? ? ?Diabetes Screening on??05/25/21.??Satisfied by Bowen Han  ??? ? ? ?Fall Risk Assessment on??05/25/21.??Satisfied by Tessa Yarbrough LPN.  ??? ? ? ?Functional Assessment on??05/11/21.??Satisfied by Deep PUTNAM, Liz  ??? ? ? ?Hypertension Management-BMP on??05/25/21.??Satisfied by Bowen Han  ??? ? ? ?Hypertension Management-Blood Pressure on??05/25/21.??Satisfied by Tessa Yarbrough LPN.  ??? ? ? ?Obesity Screening on??05/25/21.??Satisfied by Tessa Yarbrough LPN.  ?

## 2022-05-01 NOTE — HISTORICAL [facility]
This is a historical note converted from Jaye. Formatting and pictures may have been removed.  Please reference Jaye for original formatting and attached multimedia. Chief Complaint  here rakesh shoulder pain and rakesh knee pain for a few years... anti-inflammatory doesnt really help.. pt had inj in knees about a year ago  History of Present Illness  75 year old male presents today for evaluation of his bilateral knee pain and bilateral shoulder pain.? He has a known history of osteoarthritis of both knees, right > left.? He has had conservative?treatment in the past consisting of cortisone injection as well as Synvisc injections.? He has pain in the medial aspect of both knees right greater than left.? No associated injury.? He is also complaining of bilateral shoulder pain.? He has had a history of rotator cuff repair in the right shoulder?13 years ago.? He has weakness in the shoulder and pain with overhead activity. ?His main complaint is left shoulder pain.? He is doing a lot of work?around the house and?is noted pain with overhead activity.? Denies any numbness or tingling  Review of Systems  Systemic: No fever, no chills, and no recent weight change.  Head: No headache - frequent.  Eyes: No vision problems.  Otolarnygeal: No hearing loss, no earache, no epistaxis, no hoarseness, and no tooth pain. Gums normal.  Cardiovascular: No chest pain or discomfort and no palpitations.  Pulmonary: No pulmonary symptoms - no dyspnea, no shortness of breath  Gastrointestinal: Appetite not decreased. No dysphagia and no constant eructation. No nausea, no vomiting, no abdominal pain, no hematochezia.  Genitourinary: No genitourinary symptoms - No urinary hesitancy. No urinary loss of control - no burning sensation during urination.  Musculoskeletal: No calf muscle cramps and no localized soft tissue swelling  Neurological: No fainting and no convulsions.  Psychological: no depression.  Skin: No rash.  Physical Exam  Vitals &  Measurements  T:?36.1? ?C (Temporal Artery)? HR:?72(Peripheral)? BP:?134/80?  HT:?185.00?cm? WT:?89.350?kg? BMI:?26.11?  PHYSICAL FINDINGS  Cardiovascular:  Arterial Pulses: Posterior tibialis pulses were normal. Dorsalis pedis pulses were normal right.  Musculoskeletal System:  Thigh:  ?Thigh: Thigh showed quadriceps atrophy.  Knee:  right?Knee:  Grade effusion 1  Genu varum. Patella demonstrated crepitus. Anteromedial aspect was tender on palpation. Medial aspect was tender on palpation. Medial collateral ligament was tender on palpation. Active motion.  right?Knee:  Knee Motion: Value  Active flexion?120 degrees  Active extension?5 degrees  Pain was elicited by flexion. No erythema. No warmth. No medial instability. No lateral instability. No one plane medial (straight) instability. No one plane lateral (straight) instability. A Lachman test did not demonstrate one plane anterior instability.  Neurological:  Gait And Stance: A right-sided antalgic gait was observed.  ???  ???  TESTS  Imaging:  X-Ray Knee:  A complete knee x-ray with standing views was performed -of?right knee.  AP and lateral view x-rays of the Right knee with sunrise view of the patella were performed -of?right knee.  ???  ???  IMPRESSIONS RADIOLOGY TEST  Narrowing of the joint space bone on bone in medial compartment, and osteophytes arising from the?right knee.  Kellgren David grade 4 changes in medial compartment  ?   After verbal consent right knee was prepped in sterile fashion. 2 mL of lidocaine and 2 mL of betamethasone was injected intra-articularly on a 25-gauge needle. Patient tolerated the procedure well.  ?   PHYSICAL FINDINGS  Cardiovascular:  Arterial Pulses: Posterior tibialis pulses were normal. Dorsalis pedis pulses were normal left.  Musculoskeletal System:  Thigh:  ?Thigh: Thigh showed quadriceps atrophy.  Knee:  left?Knee:  Grade effusion 0  Genu varum. Patella demonstrated crepitus. Anteromedial aspect was tender on  palpation. Medial aspect was tender on palpation. Medial collateral ligament was tender on palpation. Active motion.  left?Knee:  Knee Motion: Value  Active flexion?120 degrees  Active extension?5 degrees  Pain was elicited by flexion. No erythema. No warmth. No medial instability. No lateral instability. No one plane medial (straight) instability. No one plane lateral (straight) instability. A Lachman test did not demonstrate one plane anterior instability.  Neurological:  Gait And Stance: A left-sided antalgic gait was observed.  ???  ???  TESTS  Imaging:  X-Ray Knee:  A complete knee x-ray with standing views was performed -of?left knee.  AP and lateral view x-rays of the Right knee with sunrise view of the patella were performed -of?left knee.  ???  ???  IMPRESSIONS RADIOLOGY TEST  Narrowing of the joint space?in medial compartment?and osteophytes arising from the?left knee.  Kellgren David grade 3 changes  ?   After verbal consent left knee was prepped in sterile fashion. 2 mL of lidocaine and 2 mL of betamethasone was injected intra-articularly on a 25-gauge needle. Patient tolerated the procedure well.  ?   left Shoulder:  General: No edema of the shoulder, no erythema or induration. No atrophy of the deltoid muscle left. No atrophy of the supraspinatus muscle.No atrophy of the infraspinatus muscle. No pain was elicited during a lift-off test of the shoulder, negative drop arm test  no weakness with rotator cuff resistance  Tenderness on palpation of the subacromial bursa. Tenderness on palpation of the deltoid muscle. Tenderness on palpation of the supraspinatus muscle. Tenderness on palpation of the infraspinatus muscle. Pain was elicited during a Neer impingement test. Pain was elicited during a Anton-Severo impingement test. No winged scapula. No tenderness on palpation of the teres minor muscle. No tenderness on palpation of the glenohumeral joint region. No tenderness on palpation at the bicipital  groove. No tenderness on palpation of the trapezius muscle. No tenderness on palpation of the rhomboid muscles. No tenderness on palpation of the scapula border. Motion was normal.  ?   xrays of left shoulder taken in office today show joint space narrowing of glenohumeral joint. no fractures seen  ?   Administered corticosteroid injection into the?left shoulder subacromial space using 2cc cortisone and 2cc lidocaine on 25 gauge needle under sterile technique after informed verbal consent. Risks discussed with patient prior to injection. The patient tolerated the procedure well.  ?   right Shoulder:  General: No edema of the shoulder, no erythema or induration. No atrophy of the deltoid muscle left. No atrophy of the supraspinatus muscle.No atrophy of the infraspinatus muscle. No pain was elicited during a lift-off test of the shoulder,  negative drop arm test?  weakness with rotator cuff reistance  Positive Neer and Anton impingement signs  Tenderness on palpation of the subacromial bursa. Tenderness on palpation of the deltoid muscle. Tenderness on palpation of the supraspinatus muscle. Tenderness on palpation of the infraspinatus muscle. No winged scapula. No tenderness on palpation of the teres minor muscle. No tenderness on palpation of the glenohumeral joint region. No tenderness on palpation at the bicipital groove. No tenderness on palpation of the trapezius muscle. No tenderness on palpation of the rhomboid muscles. No tenderness on palpation of the scapula border.?  Active shoulder abduction 140  Active forward elevation 160  Active internal rotation 30  Active external rotation 70  ?  Radiographs of the shoulder taken in the office today show joint space narrowing of glenohumeral joint with superior migration of humeral head.? Intact anchors. no fractures seen  Assessment/Plan  1.?Rotator cuff arthropathy of right shoulder?M12.811  ?Recommend physical therapy program  Ordered:  Clinic Follow-up PRN,  11/18/20 11:47:00 CST, Future Order, [facility]  Clinic Follow-up PRN, 11/18/20 11:40:00 CST, Future Order, [facility]  PT/OT External Referral, 11/18/20 11:41:00 CST, Rotator cuff arthropathy of right shoulder  Osteoarthritis of left shoulder  Tendonitis of left rotator cuff, Modalities  No Dry Needling  Stretch and Strengthen  Therapeutic Excercise, 3 X Week, Patient has IV, Standard Pr...  ?  2.?Osteoarthritis of left shoulder?M19.012  ?Subacromial corticosteroid injection today, physical therapy program  Ordered:  betamethasone, 12 mg, Intra-Articular, Once, first dose 11/18/20 12:00:00 CST, stop date 11/18/20 12:00:00 CST  Lidocaine inj., 2 mL, Intra-Articular, Once, first dose 11/18/20 11:35:00 CST, stop date 11/18/20 11:35:00 CST  asp/inj jnt/bursa, major [id number] PC, 11/18/20 11:35:00 CST, [facility], Routine, 11/18/20 11:35:00 CST, Osteoarthritis of left shoulder  Tendonitis of left rotator cuff  Clinic Follow-up PRN, 11/18/20 11:47:00 CST, Future Order, [facility]  Clinic Follow-up PRN, 11/18/20 11:40:00 CST, Future Order, [facility]  PT/OT External Referral, 11/18/20 11:41:00 CST, Rotator cuff arthropathy of right shoulder  Osteoarthritis of left shoulder  Tendonitis of left rotator cuff, Modalities  No Dry Needling  Stretch and Strengthen  Therapeutic Excercise, 3 X Week, Patient has IV, Standard Pr...  ?  3.?Tendonitis of left rotator cuff?M75.82  ?Subacromial corticosteroid injection today, physical therapy program  Ordered:  betamethasone, 12 mg, Intra-Articular, Once, first dose 11/18/20 12:00:00 CST, stop date 11/18/20 12:00:00 CST  Lidocaine inj., 2 mL, Intra-Articular, Once, first dose 11/18/20 11:35:00 CST, stop date 11/18/20 11:35:00 CST  asp/inj jnt/bursa, major [id number] PC, 11/18/20 11:35:00 CST, [facility], Routine, 11/18/20 11:35:00 CST, Osteoarthritis of left shoulder  Tendonitis of left rotator cuff  Clinic Follow-up PRN, 11/18/20 11:47:00 CST,  Future Order, [facility]  Clinic Follow-up PRN, 11/18/20 11:40:00 CST, Future Order, [facility]  PT/OT External Referral, 11/18/20 11:41:00 CST, Rotator cuff arthropathy of right shoulder  Osteoarthritis of left shoulder  Tendonitis of left rotator cuff, Modalities  No Dry Needling  Stretch and Strengthen  Therapeutic Excercise, 3 X Week, Patient has IV, Standard Pr...  ?  4.?Osteoarthritis of knees, bilateral?M17.0  ?Discussed robotic assisted right total knee arthroplasty. ?He is not at the point to undergo surgery.? We will continue with cortisone injection to both knees. ?He will contact us when and if he is ready?to proceed on with the operation.  Ordered:  betamethasone, 24 mg, Intra-Articular, Once, first dose 11/18/20 12:00:00 CST, stop date 11/18/20 12:00:00 CST  Lidocaine inj., 4 mL, Intra-Articular, Once, first dose 11/18/20 11:46:00 CST, stop date 11/18/20 11:46:00 CST  Asp/Inj (Bilateral) Major Jnt/Bursa [id number], 11/18/20 11:46:00 CST, [facility], Routine, 11/18/20 11:46:00 CST, Osteoarthritis of knees, bilateral  Clinic Follow-up PRN, 11/18/20 11:47:00 CST, Future Order, [facility]  ?  Referrals  Clinic Follow-up PRN, 11/18/20 11:40:00 CST, Future Order, [facility]  Clinic Follow-up PRN, 11/18/20 11:47:00 CST, Future Order, [facility]  PT/OT External Referral, 11/18/20 11:41:00 CST, Rotator cuff arthropathy of right shoulder  Osteoarthritis of left shoulder  Tendonitis of left rotator cuff, Modalities  No Dry Needling  Stretch and Strengthen  Therapeutic Excercise, 3 X Week, Patient has IV, Standard Pr...   Problem List/Past Medical History  Ongoing  Arthritis  Arthritis of knee, right  balance issue  barretts esophagus  diabetes  gout  hx of pvcs  Hypertension  Osteoarthritis of knees, bilateral  Osteoarthritis of left shoulder  Osteoarthritis of right knee  Rotator cuff arthropathy of right shoulder  stroke  Historical  kidney stones  sleep  apnea  Procedure/Surgical History  Arthroscopy of Shoulder with Rotator Cuf (Right) (08/28/2014)  Arthroscopy, shoulder, surgical; with rotator cuff repair. (08/28/2014)  Injection of anesthetic into peripheral nerve for analgesia (08/28/2014)  Injection, anesthetic agent; brachial plexus, single. (08/28/2014)  Rotator cuff repair (08/28/2014)  OTHER IMMOBILIZATION, PRESSURE, AND ATTENTION TO WOUND (09/26/2013)  Physician or other qualified health care professional attendance and supervision of hyperbaric oxygen therapy, per session. (09/26/2013)  Debridement including removal of foreign material at the site of an open fracture and/or an open dislocation (eg, excisional debridement); skin, subcutaneous tissue, muscle fascia, muscle, and bone. (09/24/2013)  Debridement of open fracture of phalanges of hand (09/24/2013)  OTHER IMMOBILIZATION, PRESSURE, AND ATTENTION TO WOUND (09/20/2013)  Physician or other qualified health care professional attendance and supervision of hyperbaric oxygen therapy, per session. (09/20/2013)  OTHER IMMOBILIZATION, PRESSURE, AND ATTENTION TO WOUND (09/19/2013)  Physician or other qualified health care professional attendance and supervision of hyperbaric oxygen therapy, per session. (09/19/2013)  Physician or other qualified health care professional attendance and supervision of hyperbaric oxygen therapy, per session. (09/19/2013)  OTHER IMMOBILIZATION, PRESSURE, AND ATTENTION TO WOUND (09/18/2013)  Physician or other qualified health care professional attendance and supervision of hyperbaric oxygen therapy, per session. (09/18/2013)  I&D OF OPEN FX OF FINGER WITH HYPERBARICS (2013)  abdominal hernia  back surgery  catract  foot surgery  roght rotator cuff  rt fingers amputated  sinus sx   Medications  Allegra 60 mg oral tablet, 60 mg= 1 tab(s), Oral, Daily,? ?Not Taking per Prescriber: done Last Dose Date/Time Unknown  allopurinol 300 mg oral tablet, 300 mg= 1 tab(s), Oral, Daily  Breo  Ellipta 200 mcg-25 mcg/inh inhalation powder, 1 puff(s), INH, Daily,? ?Not Taking per Prescriber: Last Dose Date/Time Unknown  Celestone, 12 mg, Intra-Articular, Once  Celestone, 24 mg, Intra-Articular, Once  CHLORTHALIDONE 25 MG TABLET, 25 mg= 1 tab(s), Oral, Daily  Diovan  mg-12.5 mg oral tablet, 1 tab(s), Oral, Daily,? ?Not Taking, Completed Rx: Last Dose Date/Time Unknown  glipiZIDE 5 mg oral tablet, extended release (XL tab), 5 mg= 1 tab(s), Oral, Daily,? ?Not Taking per Prescriber: Last Dose Date/Time Unknown  LABETALOL  MG TABLET, 200 mg= 1 tab(s), Oral, BID  LANSOPRAZOLE DR 30 MG CAPSULE, 30 mg= 1 cap(s), Oral, Daily,? ?Not Taking, Completed Rx: done  lidocaine 2% injectable solution, 2 mL, Intra-Articular, Once  lidocaine 2% injectable solution, 4 mL, Intra-Articular, Once  meclizine 12.5 mg oral tablet, 12.5 mg= 1 tab(s), Oral, TID, PRN,? ?Not Taking per Prescriber: Last Dose Date/Time Unknown  METFORMIN  MG TABLET,? ?Not Taking, Completed Rx: done  multivitamin with minerals (Adult Tab), See Instructions  Nexium 40 mg oral delayed release capsule (pt. own), 40 mg= 1 cap(s), Oral, Daily,? ?Not Taking, Completed Rx: done Last Dose Date/Time Unknown  ONGLYZA 5 MG TABLET, 5 mg= 1 tab(s), Oral, Daily,? ?Not Taking, Completed Rx: done  Pantoprazole 40 mg ORAL EC-Tablet, 40 mg= 1 tab(s), Oral, Daily  Plavix 75 mg oral tablet, 75 mg= 1 tab(s), Oral, Daily,? ?Not Taking, Completed Rx: done Last Dose Date/Time Unknown  probenecid 500 mg oral tablet, 1 tab, Oral, Daily,? ?Not Taking, Completed Rx: done  tamsulosin 0.4 mg oral capsule, 0.4 mg= 1 cap(s), Oral, Daily  Ziac 5 mg-6.25 mg oral tablet, 1 tab(s), Oral, Daily,? ?Not Taking, Completed Rx: done Last Dose Date/Time Unknown  Allergies  Figs?(rash)  Horse serum proteins specific IgE antibody measurement?(rash)  Social History  Abuse/Neglect  No, 11/18/2020  Alcohol  Current, Daily, 08/17/2014  Employment/School  Employed,  02/20/2017  Home/Environment  Lives with Spouse. Living situation: Home/Independent. Home equipment: Glucose monitoring., 08/17/2014  Nutrition/Health  Diabetic, 08/17/2014  Substance Use - Denies Substance Abuse, 09/23/2013  Never, 02/20/2017  Tobacco - Denies Tobacco Use, 09/23/2013  Never (less than 100 in lifetime), N/A, 11/18/2020  Family History  Family history is unknown  Health Maintenance  Health Maintenance  ???Pending?(in the next year)  ??? ??OverDue  ??? ? ? ?Hypertension Management-BMP due??08/27/15??and every 1??year(s)  ??? ? ? ?Diabetes Maintenance-Serum Creatinine due??08/27/15??and every 1??year(s)  ??? ? ? ?Diabetes Screening due??08/26/17??and every 3??year(s)  ??? ? ? ?Advance Directive due??01/02/20??and every 1??year(s)  ??? ? ? ?Cognitive Screening due??01/02/20??and every 1??year(s)  ??? ??Due?  ??? ? ? ?Influenza Vaccine due??10/01/20??and every 1??day(s)  ??? ? ? ?ADL Screening due??11/18/20??and every 1??year(s)  ??? ? ? ?Aspirin Therapy for CVD Prevention due??11/18/20??and every 1??year(s)  ??? ? ? ?Depression Screening due??11/18/20??Unknown Frequency  ??? ? ? ?Diabetes Maintenance-Eye Exam due??11/18/20??Unknown Frequency  ??? ? ? ?Diabetes Maintenance-Fasting Lipid Profile due??11/18/20??Variable frequency  ??? ? ? ?Diabetes Maintenance-Foot Exam due??11/18/20??Unknown Frequency  ??? ? ? ?Diabetes Maintenance-HgbA1c due??11/18/20??Unknown Frequency  ??? ? ? ?Hypertension Management-Education due??11/18/20??and every 1??year(s)  ??? ? ? ?Medicare Annual Wellness Exam due??11/18/20??and every 1??year(s)  ??? ? ? ?Pneumococcal Vaccine due??11/18/20??Unknown Frequency  ??? ? ? ?Tetanus Vaccine due??11/18/20??and every 10??year(s)  ??? ? ? ?Zoster Vaccine due??11/18/20??Unknown Frequency  ??? ??Due In Future?  ??? ? ? ?Obesity Screening not due until??01/01/21??and every 1??year(s)  ??? ? ? ?Fall Risk Assessment not due until??01/02/21??and every 1??year(s)  ??? ? ? ?Functional  Assessment not due until??01/02/21??and every 1??year(s)  ??? ? ? ?Blood Pressure Screening not due until??01/19/21??and every 1??year(s)  ??? ? ? ?Body Mass Index Check not due until??01/19/21??and every 1??year(s)  ??? ? ? ?Hypertension Management-Blood Pressure not due until??01/19/21??and every 1??year(s)  ???Satisfied?(in the past 1 year)  ??? ??Satisfied?  ??? ? ? ?Blood Pressure Screening on??11/18/20.??Satisfied by Keyur Kendrick  ??? ? ? ?Body Mass Index Check on??11/18/20.??Satisfied by Keyur Kendrick  ??? ? ? ?Depression Screening on??11/18/20.??Satisfied by Keyur Kendrick  ??? ? ? ?Fall Risk Assessment on??11/18/20.??Satisfied by Keyur Kendrick  ??? ? ? ?Functional Assessment on??01/20/20.??Satisfied by Tessa Brown LPN  ??? ? ? ?Hypertension Management-Blood Pressure on??11/18/20.??Satisfied by Keyur Kendrick  ??? ? ? ?Obesity Screening on??11/18/20.??Satisfied by Keyur Kendrick  ?

## 2022-05-19 ENCOUNTER — TELEPHONE (OUTPATIENT)
Dept: ORTHOPEDICS | Facility: CLINIC | Age: 77
End: 2022-05-19

## 2022-05-19 NOTE — TELEPHONE ENCOUNTER
I informed the patient to apply ice, elevate, and use anti- inflammatories to help with the pain. I offered then an earlier appointment they requested to keep there appointment to see Dr. Phillips on 5/23/22.

## 2022-05-23 ENCOUNTER — OFFICE VISIT (OUTPATIENT)
Dept: ORTHOPEDICS | Facility: CLINIC | Age: 77
End: 2022-05-23
Payer: MEDICARE

## 2022-05-23 ENCOUNTER — HOSPITAL ENCOUNTER (OUTPATIENT)
Dept: RADIOLOGY | Facility: CLINIC | Age: 77
Discharge: HOME | End: 2022-05-23
Attending: SPECIALIST
Payer: MEDICARE

## 2022-05-23 VITALS
DIASTOLIC BLOOD PRESSURE: 80 MMHG | SYSTOLIC BLOOD PRESSURE: 140 MMHG | BODY MASS INDEX: 24.65 KG/M2 | HEART RATE: 60 BPM | WEIGHT: 186 LBS | HEIGHT: 73 IN
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
## 2022-05-23 DIAGNOSIS — Z96.651 H/O TOTAL KNEE REPLACEMENT, RIGHT: Primary | ICD-10-CM

## 2022-05-23 PROCEDURE — 99213 PR OFFICE/OUTPT VISIT, EST, LEVL III, 20-29 MIN: ICD-10-PCS | Mod: ,,, | Performed by: SPECIALIST

## 2022-05-23 PROCEDURE — 73564 PR  X-RAY KNEE 4+ VIEW: ICD-10-PCS | Mod: RT,,, | Performed by: SPECIALIST

## 2022-05-23 PROCEDURE — 99213 OFFICE O/P EST LOW 20 MIN: CPT | Mod: ,,, | Performed by: SPECIALIST

## 2022-05-23 PROCEDURE — 73564 X-RAY EXAM KNEE 4 OR MORE: CPT | Mod: RT,,, | Performed by: SPECIALIST

## 2022-05-23 RX ORDER — ASPIRIN 81 MG/81MG
81 CAPSULE ORAL
COMMUNITY

## 2022-05-23 RX ORDER — DULAGLUTIDE 0.75 MG/.5ML
INJECTION, SOLUTION SUBCUTANEOUS
COMMUNITY
Start: 2021-08-27

## 2022-05-23 RX ORDER — FERROUS SULFATE 325(65) MG
40 TABLET ORAL
COMMUNITY

## 2022-05-23 RX ORDER — TAMSULOSIN HYDROCHLORIDE 0.4 MG/1
1 CAPSULE ORAL NIGHTLY
COMMUNITY
Start: 2022-04-12

## 2022-05-23 RX ORDER — LABETALOL 300 MG/1
300 TABLET, FILM COATED ORAL 2 TIMES DAILY
COMMUNITY
Start: 2022-05-16

## 2022-05-23 RX ORDER — PANTOPRAZOLE SODIUM 40 MG/1
40 TABLET, DELAYED RELEASE ORAL DAILY
COMMUNITY
Start: 2022-03-07

## 2022-05-23 NOTE — PROGRESS NOTES
History reviewed. No pertinent past medical history.    Past Surgical History:   Procedure Laterality Date    BACK SURGERY      FOOT SURGERY      GALLBLADDER SURGERY      HAND SURGERY      HERNIA REPAIR      KNEE SURGERY      SHOULDER SURGERY         Current Outpatient Medications   Medication Sig    aspirin (VAZALORE) 81 mg Cap Take 81 mg by mouth.    dulaglutide (TRULICITY) 0.75 mg/0.5 mL pen injector Take once weekly.    ferrous sulfate (FEOSOL) 325 mg (65 mg iron) Tab tablet Take 40 mg by mouth.    labetaloL (NORMODYNE) 300 MG tablet Take 300 mg by mouth 2 (two) times daily.    pantoprazole (PROTONIX) 40 MG tablet Take 40 mg by mouth once daily.    tamsulosin (FLOMAX) 0.4 mg Cap Take 1 capsule by mouth nightly.     No current facility-administered medications for this visit.       Review of patient's allergies indicates:   Allergen Reactions    Snake antivenom polyvalent no.1 Other (See Comments)       History reviewed. No pertinent family history.    Social History     Socioeconomic History    Marital status: Unknown   Tobacco Use    Smoking status: Never Smoker    Smokeless tobacco: Never Used       Chief Complaint:   Chief Complaint   Patient presents with    Follow-up     R TKA 5/11/21, fell twice since then        Consulting Physician: No ref. provider found    History of present illness:    This is a 77 y.o. year old male who complains of falling off a ladder 3 weeks ago.  He had swelling on the medial aspect of his right knee for which he had a total knee arthroplasty in the past.  The swelling has gone down and he has no pain.  He is here for checkup and radiographs today.    Review of Systems:    Constitution:   Denies chills, fever, and sweats.  HENT:   Denies headaches or blurry vision.  Cardiovascular:  Denies chest pain or irregular heart beat.  Respiratory:   Denies cough or shortness of breath.  Gastrointestinal:  Denies abdominal pain, nausea, or vomiting.  Musculoskeletal:   " Denies muscle cramps.  Neurological:   Denies dizziness or focal weakness.  Psychiatric/Behavior: Normal mental status.  Hematology/Lymph:  Denies bleeding problem or easy bruising/bleeding.  Skin:    Denies rash or suspicious lesions.    Examination:    Vital Signs:    Vitals:    05/23/22 0906   BP: (!) 140/80   Pulse: 60   Weight: 84.4 kg (186 lb)   Height: 6' 1" (1.854 m)       Body mass index is 24.54 kg/m².    Constitution:   Well-developed, well nourished patient in no acute distress.  Neurological:   Alert and oriented x 3 and cooperative to examination.     Psychiatric/Behavior: Normal mental status.  Respiratory:   No shortness of breath.  Eyes:    Extraoccular muscles intact  Skin:    No scars, rash or suspicious lesions.    Physical Exam:  Right knee exam:  No swelling, no redness, no increased T  Well-healed incision  No tenderness  No bruising  Stable symmetrically balanced collateral ligaments throughout a range of motion  Range of motion 0° to 140°  Normal patellar tracking  Normal gait  2+ pulses  Normal sensation and motor function    Imaging: X-rays ordered and images interpreted today personally by me of the right knee.  Patient has pristine interfaces and a stable well-aligned right total knee arthroplasty.  No fracture or dislocation.         Assessment: H/O total knee replacement, right  -     X-Ray Knee Complete 4 Or More Views Right; Future; Expected date: 05/23/2022    Fall, initial encounter        Plan:  Regular activity  Avoid ladders  Follow-up p.r.n.      DISCLAIMER: This note may have been dictated using voice recognition software and may contain grammatical errors.     NOTE: Consult report sent to referring provider via [facility] EMR.  "

## 2022-10-31 ENCOUNTER — OFFICE VISIT (OUTPATIENT)
Dept: ORTHOPEDICS | Facility: CLINIC | Age: 77
End: 2022-10-31
Payer: MEDICARE

## 2022-10-31 ENCOUNTER — HOSPITAL ENCOUNTER (OUTPATIENT)
Dept: RADIOLOGY | Facility: CLINIC | Age: 77
Discharge: HOME | End: 2022-10-31
Attending: PHYSICIAN ASSISTANT
Payer: MEDICARE

## 2022-10-31 VITALS
WEIGHT: 191.63 LBS | SYSTOLIC BLOOD PRESSURE: 141 MMHG | DIASTOLIC BLOOD PRESSURE: 89 MMHG | HEIGHT: 73 IN | BODY MASS INDEX: 25.4 KG/M2 | HEART RATE: 73 BPM

## 2022-10-31 DIAGNOSIS — M19.012 PRIMARY OSTEOARTHRITIS OF LEFT SHOULDER: ICD-10-CM

## 2022-10-31 DIAGNOSIS — M25.511 ACUTE PAIN OF BOTH SHOULDERS: ICD-10-CM

## 2022-10-31 DIAGNOSIS — M25.511 ACUTE PAIN OF BOTH SHOULDERS: Primary | ICD-10-CM

## 2022-10-31 DIAGNOSIS — M25.512 ACUTE PAIN OF BOTH SHOULDERS: ICD-10-CM

## 2022-10-31 DIAGNOSIS — M12.811 ROTATOR CUFF ARTHROPATHY OF RIGHT SHOULDER: ICD-10-CM

## 2022-10-31 DIAGNOSIS — M25.512 ACUTE PAIN OF BOTH SHOULDERS: Primary | ICD-10-CM

## 2022-10-31 PROCEDURE — 73030 XR SHOULDER COMPLETE 2 OR MORE VIEWS RIGHT: ICD-10-PCS | Mod: RT,,, | Performed by: PHYSICIAN ASSISTANT

## 2022-10-31 PROCEDURE — 73030 X-RAY EXAM OF SHOULDER: CPT | Mod: LT,,, | Performed by: PHYSICIAN ASSISTANT

## 2022-10-31 PROCEDURE — 20610 LARGE JOINT ASPIRATION/INJECTION: R SUBACROMIAL BURSA: ICD-10-PCS | Mod: 50,,, | Performed by: PHYSICIAN ASSISTANT

## 2022-10-31 PROCEDURE — 73030 X-RAY EXAM OF SHOULDER: CPT | Mod: RT,,, | Performed by: PHYSICIAN ASSISTANT

## 2022-10-31 PROCEDURE — 99213 PR OFFICE/OUTPT VISIT, EST, LEVL III, 20-29 MIN: ICD-10-PCS | Mod: 25,,, | Performed by: PHYSICIAN ASSISTANT

## 2022-10-31 PROCEDURE — 20610 DRAIN/INJ JOINT/BURSA W/O US: CPT | Mod: 50,,, | Performed by: PHYSICIAN ASSISTANT

## 2022-10-31 PROCEDURE — 99213 OFFICE O/P EST LOW 20 MIN: CPT | Mod: 25,,, | Performed by: PHYSICIAN ASSISTANT

## 2022-10-31 RX ORDER — LISINOPRIL 20 MG/1
TABLET ORAL
COMMUNITY

## 2022-10-31 RX ORDER — ROSUVASTATIN CALCIUM 20 MG/1
20 TABLET, COATED ORAL DAILY
COMMUNITY

## 2022-10-31 RX ORDER — LIDOCAINE HYDROCHLORIDE 20 MG/ML
3 INJECTION, SOLUTION INFILTRATION; PERINEURAL
Status: DISCONTINUED | OUTPATIENT
Start: 2022-10-31 | End: 2022-10-31 | Stop reason: HOSPADM

## 2022-10-31 RX ORDER — BETAMETHASONE SODIUM PHOSPHATE AND BETAMETHASONE ACETATE 3; 3 MG/ML; MG/ML
12 INJECTION, SUSPENSION INTRA-ARTICULAR; INTRALESIONAL; INTRAMUSCULAR; SOFT TISSUE
Status: DISCONTINUED | OUTPATIENT
Start: 2022-10-31 | End: 2022-10-31 | Stop reason: HOSPADM

## 2022-10-31 RX ORDER — DAPAGLIFLOZIN 5 MG/1
TABLET, FILM COATED ORAL
COMMUNITY
Start: 2022-08-08

## 2022-10-31 RX ORDER — ASCORBIC ACID 500 MG
500 TABLET ORAL DAILY
COMMUNITY

## 2022-10-31 RX ADMIN — LIDOCAINE HYDROCHLORIDE 3 MG: 20 INJECTION, SOLUTION INFILTRATION; PERINEURAL at 10:10

## 2022-10-31 RX ADMIN — BETAMETHASONE SODIUM PHOSPHATE AND BETAMETHASONE ACETATE 12 MG: 3; 3 INJECTION, SUSPENSION INTRA-ARTICULAR; INTRALESIONAL; INTRAMUSCULAR; SOFT TISSUE at 10:10

## 2022-10-31 NOTE — PROCEDURES
Large Joint Aspiration/Injection: L subacromial bursa    Date/Time: 10/31/2022 10:15 AM  Performed by: TRINA Etienne  Authorized by: TRINA Etienne     Consent Done?:  Yes (Verbal)  Indications:  Pain  Site marked: the procedure site was marked    Timeout: prior to procedure the correct patient, procedure, and site was verified    Prep: patient was prepped and draped in usual sterile fashion    Approach:  Posterior  Location:  Shoulder  Site:  L subacromial bursa  Medications:  12 mg betamethasone acetate-betamethasone sodium phosphate 6 mg/mL; 3 mg LIDOcaine HCL 20 mg/ml (2%) 20 mg/mL (2 %)  Patient tolerance:  Patient tolerated the procedure well with no immediate complications

## 2022-10-31 NOTE — PROCEDURES
Large Joint Aspiration/Injection: R subacromial bursa    Date/Time: 10/31/2022 10:15 AM  Performed by: TRINA Etienne  Authorized by: TRINA Etienne     Consent Done?:  Yes (Verbal)  Indications:  Pain  Site marked: the procedure site was marked    Timeout: prior to procedure the correct patient, procedure, and site was verified    Prep: patient was prepped and draped in usual sterile fashion    Approach:  Posterior  Location:  Shoulder  Site:  R subacromial bursa  Medications:  12 mg betamethasone acetate-betamethasone sodium phosphate 6 mg/mL; 3 mg LIDOcaine HCL 20 mg/ml (2%) 20 mg/mL (2 %)  Patient tolerance:  Patient tolerated the procedure well with no immediate complications

## 2022-10-31 NOTE — PROGRESS NOTES
Chief Complaint:   Chief Complaint   Patient presents with    Shoulder Pain     Pt reports a fall about 3 weeks ago where he injured his shoulders, and since then pt has been experiencing a weakening pain which unable him to hold/carry things or lift his arms above the shoulder line. Pt has been taking Tylenol extra strenght which gives him a temporary relief. Pt had cortisone injection in the past for his knees (never for the shoulder) and he feels like the Cortisone injection always helped w/ his pain.       History of present illness:    77-year-old right-hand-dominant male presents office today for evaluation of his bilateral shoulder pain.  He is requesting cortisone injections today.  He does have a known history of osteoarthritis of both shoulders.  He has a history of multiple surgeries on his right shoulder for rotator cuff tearing.  He has difficulty with overhead lifting.  He did suffer a stroke in September while in [place].  His left side was affected.  He is working with physical therapy and seeing improvement.    Past Medical History:   Diagnosis Date    Pereira's esophagus     Essential (primary) hypertension     Gout, unspecified     Impaired gait and mobility     Osteoarthritis of both knees     Rotator cuff arthropathy of right shoulder     Stroke        Past Surgical History:   Procedure Laterality Date    BACK SURGERY      BACK SURGERY      CATARACT EXTRACTION      FINGER AMPUTATION Right     FOOT SURGERY      GALLBLADDER SURGERY      HAND SURGERY      HERNIA REPAIR      KNEE SURGERY      SHOULDER SURGERY         Current Outpatient Medications   Medication Sig    apixaban (ELIQUIS) 2.5 mg Tab Eliquis 2.5 mg tablet    ascorbic acid, vitamin C, (VITAMIN C) 500 MG tablet Take 500 mg by mouth once daily.    aspirin (VAZALORE) 81 mg Cap Take 81 mg by mouth.    dapagliflozin (FARXIGA) 5 mg Tab tablet Farxiga 5 mg tablet    dulaglutide (TRULICITY) 0.75 mg/0.5 mL pen injector Take once weekly.     "ferrous sulfate (FEOSOL) 325 mg (65 mg iron) Tab tablet Take 40 mg by mouth.    labetaloL (NORMODYNE) 300 MG tablet Take 300 mg by mouth 2 (two) times daily.    lisinopriL (PRINIVIL,ZESTRIL) 20 MG tablet lisinopril 20 mg tablet    multivit-min/FA/lycopen/lutein (CENTRUM SILVER MEN ORAL) Take by mouth.    pantoprazole (PROTONIX) 40 MG tablet Take 40 mg by mouth once daily.    rosuvastatin (CRESTOR) 20 MG tablet Take 20 mg by mouth once daily.    tamsulosin (FLOMAX) 0.4 mg Cap Take 1 capsule by mouth nightly.     No current facility-administered medications for this visit.       Review of patient's allergies indicates:   Allergen Reactions    Snake antivenom polyvalent no.1 Other (See Comments)       History reviewed. No pertinent family history.    Social History     Socioeconomic History    Marital status: Unknown   Tobacco Use    Smoking status: Never    Smokeless tobacco: Never   Substance and Sexual Activity    Alcohol use: Yes     Alcohol/week: 1.0 standard drink     Types: 1 Glasses of wine per week     Comment: occasionally    Drug use: Never    Sexual activity: Not Currently         Review of Systems:    Constitution:   Denies chills, fever, and sweats.  HENT:   Denies headaches or blurry vision.  Cardiovascular:  Denies chest pain or irregular heart beat.  Respiratory:   Denies cough or shortness of breath.  Gastrointestinal:  Denies abdominal pain, nausea, or vomiting.  Musculoskeletal:   Denies muscle cramps.  Neurological:   Denies dizziness or focal weakness.  Psychiatric/Behavior: Normal mental status.  Hematology/Lymph:  Denies bleeding problem or easy bruising/bleeding.  Skin:    Denies rash or suspicious lesions.    Examination:    Vital Signs:    Vitals:    10/31/22 1037 10/31/22 1045   BP: (!) 141/89    Pulse: 73    Weight: 86.9 kg (191 lb 9.6 oz)    Height: 6' 1" (1.854 m)    PainSc:    8       Body mass index is 25.28 kg/m².    Constitution:   Well-developed, well nourished patient in no acute " distress.  Neurological:   Alert and oriented x 3 and cooperative to examination.     Psychiatric/Behavior: Normal mental status.  Respiratory:   No shortness of breath.  Eyes:    Extraoccular muscles intact  Skin:    No scars, rash or suspicious lesions.    Physical Exam:     Right Shoulder:     No swelling, erythema or increased heat    Tender over deltoid, supraspinatus and infraspinatus    Tender over bicipital groove    negative drop arm test Positive Neer and Anton impingement signs     weakness with rotator cuff resistance   Active shoulder abduction 120  Active forward elevation  160  Active internal rotation 70   Active external rotation 80     Radiographs of the right shoulder, four views, taken in the office today show superior migration of the humeral head.  Glenohumeral joint space narrowing seen.  Metal anchors seen    Left Shoulder:     No swelling, erythema or increased heat    Tender over deltoid, supraspinatus and infraspinatus    Tender over bicipital groove    negative drop arm test Positive Neer and Anton impingement signs    Mild weakness with rotator cuff resistance   Active shoulder abduction 160  Active forward elevation  160  Active internal rotation 70   Active external rotation 80     Radiographs of the left shoulder, four views, taken in the office today show glenohumeral joint space narrowing.            Assessment:     Acute pain of both shoulders  -     X-Ray Shoulder 2 or More Views Left; Future; Expected date: 10/31/2022  -     X-ray Shoulder 2 or More Views Right; Future; Expected date: 10/31/2022    Rotator cuff arthropathy of right shoulder  -     Large Joint Aspiration/Injection: R subacromial bursa    Primary osteoarthritis of left shoulder  -     Large Joint Aspiration/Injection: L subacromial bursa        Plan:      Conservative treatment consisting of corticosteroid injections to both subacromial spaces today.  He will follow-up with us as needed        No follow-ups on  file.    DISCLAIMER: This note may have been dictated using voice recognition software and may contain grammatical errors.

## 2024-05-29 ENCOUNTER — OFFICE VISIT (OUTPATIENT)
Dept: ORTHOPEDICS | Facility: CLINIC | Age: 79
End: 2024-05-29
Payer: MEDICARE

## 2024-05-29 ENCOUNTER — HOSPITAL ENCOUNTER (OUTPATIENT)
Dept: RADIOLOGY | Facility: CLINIC | Age: 79
Discharge: HOME | End: 2024-05-29
Attending: SPECIALIST
Payer: MEDICARE

## 2024-05-29 VITALS
HEIGHT: 73 IN | WEIGHT: 205 LBS | BODY MASS INDEX: 27.17 KG/M2 | HEART RATE: 78 BPM | DIASTOLIC BLOOD PRESSURE: 76 MMHG | SYSTOLIC BLOOD PRESSURE: 143 MMHG

## 2024-05-29 DIAGNOSIS — M25.562 LEFT KNEE PAIN, UNSPECIFIED CHRONICITY: ICD-10-CM

## 2024-05-29 DIAGNOSIS — M17.12 PRIMARY OSTEOARTHRITIS OF LEFT KNEE: ICD-10-CM

## 2024-05-29 DIAGNOSIS — Z96.651 HISTORY OF TOTAL KNEE ARTHROPLASTY, RIGHT: ICD-10-CM

## 2024-05-29 DIAGNOSIS — M25.521 RIGHT ELBOW PAIN: ICD-10-CM

## 2024-05-29 DIAGNOSIS — M19.021 PRIMARY OSTEOARTHRITIS OF RIGHT ELBOW: ICD-10-CM

## 2024-05-29 DIAGNOSIS — M25.521 RIGHT ELBOW PAIN: Primary | ICD-10-CM

## 2024-05-29 DIAGNOSIS — M70.21 OLECRANON BURSITIS OF RIGHT ELBOW: ICD-10-CM

## 2024-05-29 PROCEDURE — 73080 X-RAY EXAM OF ELBOW: CPT | Mod: RT,,, | Performed by: SPECIALIST

## 2024-05-29 PROCEDURE — 99214 OFFICE O/P EST MOD 30 MIN: CPT | Mod: 25,,, | Performed by: SPECIALIST

## 2024-05-29 PROCEDURE — 73564 X-RAY EXAM KNEE 4 OR MORE: CPT | Mod: LT,,, | Performed by: SPECIALIST

## 2024-05-29 PROCEDURE — 20610 DRAIN/INJ JOINT/BURSA W/O US: CPT | Mod: LT,,, | Performed by: SPECIALIST

## 2024-05-29 RX ORDER — DICLOFENAC SODIUM 10 MG/G
2 GEL TOPICAL
COMMUNITY

## 2024-05-29 RX ORDER — TRIAMTERENE/HYDROCHLOROTHIAZID 37.5-25 MG
TABLET ORAL
COMMUNITY

## 2024-05-29 RX ORDER — ALBUTEROL SULFATE 90 UG/1
2 AEROSOL, METERED RESPIRATORY (INHALATION) EVERY 4 HOURS PRN
COMMUNITY
Start: 2024-04-01

## 2024-05-29 RX ORDER — METHYLPREDNISOLONE ACETATE 80 MG/ML
80 INJECTION, SUSPENSION INTRA-ARTICULAR; INTRALESIONAL; INTRAMUSCULAR; SOFT TISSUE
Status: DISCONTINUED | OUTPATIENT
Start: 2024-05-29 | End: 2024-05-29 | Stop reason: HOSPADM

## 2024-05-29 RX ORDER — NITROGLYCERIN 0.4 MG/1
TABLET SUBLINGUAL
COMMUNITY

## 2024-05-29 RX ORDER — BISOPROLOL FUMARATE 10 MG/1
10 TABLET, FILM COATED ORAL
COMMUNITY

## 2024-05-29 RX ORDER — RIVAROXABAN 20 MG/1
TABLET, FILM COATED ORAL
COMMUNITY
Start: 2023-08-25

## 2024-05-29 RX ORDER — GABAPENTIN 300 MG/1
300 CAPSULE ORAL
COMMUNITY

## 2024-05-29 RX ORDER — AMLODIPINE BESYLATE 5 MG/1
TABLET ORAL
COMMUNITY

## 2024-05-29 RX ORDER — FLUTICASONE PROPIONATE AND SALMETEROL 100; 50 UG/1; UG/1
1 POWDER RESPIRATORY (INHALATION)
COMMUNITY
Start: 2024-04-08 | End: 2025-04-08

## 2024-05-29 RX ORDER — HYDRALAZINE HYDROCHLORIDE 25 MG/1
25 TABLET, FILM COATED ORAL
COMMUNITY

## 2024-05-29 RX ORDER — DOXAZOSIN 2 MG/1
1 TABLET ORAL NIGHTLY
COMMUNITY

## 2024-05-29 RX ORDER — DRONEDARONE 400 MG/1
1 TABLET, FILM COATED ORAL 2 TIMES DAILY
COMMUNITY

## 2024-05-29 RX ADMIN — METHYLPREDNISOLONE ACETATE 80 MG: 80 INJECTION, SUSPENSION INTRA-ARTICULAR; INTRALESIONAL; INTRAMUSCULAR; SOFT TISSUE at 01:05

## 2024-05-29 NOTE — PROGRESS NOTES
Past Medical History:   Diagnosis Date    Pereira's esophagus     Essential (primary) hypertension     Gout, unspecified     Impaired gait and mobility     Osteoarthritis of both knees     Rotator cuff arthropathy of right shoulder     Stroke        Past Surgical History:   Procedure Laterality Date    BACK SURGERY      BACK SURGERY      CATARACT EXTRACTION      FINGER AMPUTATION Right     FOOT SURGERY      GALLBLADDER SURGERY      HAND SURGERY      HERNIA REPAIR      KNEE SURGERY      SHOULDER SURGERY         Current Outpatient Medications   Medication Sig    albuterol (PROVENTIL/VENTOLIN HFA) 90 mcg/actuation inhaler Inhale 2 puffs into the lungs every 4 (four) hours as needed.    amLODIPine (NORVASC) 5 MG tablet amlodipine 5 mg tablet    ascorbic acid, vitamin C, (VITAMIN C) 500 MG tablet Take 500 mg by mouth once daily.    aspirin (VAZALORE) 81 mg Cap Take 81 mg by mouth.    bisoprolol (ZEBETA) 10 MG tablet Take 10 mg by mouth.    dapagliflozin (FARXIGA) 5 mg Tab tablet Farxiga 5 mg tablet    doxazosin (CARDURA) 2 MG tablet Take 1 tablet by mouth every evening.    dulaglutide (TRULICITY) 0.75 mg/0.5 mL pen injector Take once weekly.    ferrous sulfate (FEOSOL) 325 mg (65 mg iron) Tab tablet Take 40 mg by mouth.    fluticasone-salmeterol diskus inhaler 100-50 mcg Inhale 1 puff into the lungs.    gabapentin (NEURONTIN) 300 MG capsule Take 300 mg by mouth.    hydrALAZINE (APRESOLINE) 25 MG tablet Take 25 mg by mouth.    MULTAQ 400 mg Tab Take 1 tablet by mouth 2 (two) times daily.    multivit-min/FA/lycopen/lutein (CENTRUM SILVER MEN ORAL) Take by mouth.    nitroGLYCERIN (NITROSTAT) 0.4 MG SL tablet     pantoprazole (PROTONIX) 40 MG tablet Take 40 mg by mouth once daily.    rosuvastatin (CRESTOR) 20 MG tablet Take 20 mg by mouth once daily.    tamsulosin (FLOMAX) 0.4 mg Cap Take 1 capsule by mouth nightly.    triamterene-hydrochlorothiazide 37.5-25 mg (MAXZIDE-25) 37.5-25 mg per tablet     XARELTO 20 mg Tab      diclofenac sodium (VOLTAREN) 1 % Gel Apply 2 g topically.    lisinopriL (PRINIVIL,ZESTRIL) 20 MG tablet lisinopril 20 mg tablet (Patient not taking: Reported on 5/29/2024)     No current facility-administered medications for this visit.       Review of patient's allergies indicates:   Allergen Reactions    Snake antivenom polyvalent no.1 Other (See Comments)       No family history on file.    Social History     Socioeconomic History    Marital status: Unknown   Tobacco Use    Smoking status: Never    Smokeless tobacco: Never   Substance and Sexual Activity    Alcohol use: Yes     Alcohol/week: 1.0 standard drink of alcohol     Types: 1 Glasses of wine per week     Comment: occasionally    Drug use: Never    Sexual activity: Not Currently     Partners: Female       Chief Complaint:   Chief Complaint   Patient presents with    Left Knee - Pain     Pt states he has been experiencing pain in his knee that been going on for a few years. Pt had cortisone in the past with some relief.     Right Elbow - Pain, Swelling     Pt presents a fluid pocket in his Rt elbow which only  hurts if he touches it.        Consulting Physician: No ref. provider found    History of present illness:    This is a 79 y.o. year old male who complains of left knee pain and weakness since his stroke about a year ago.  He has weak on the entire left side.  Bears more weight on the right knee when he walks.  He is several years postop from right total knee arthroplasty.  He has little pain over the ITB band for the past couple of months.  He over compensates.  His right elbow has had some chronic olecranon bursitis after he is bumped into things and fallen numerous times over the past year.  He has full range motion.  He has had it drained twice.  There was no evidence of infection.  His hemoglobin A1c is 6.5.    Review of Systems:    Constitution:   Denies chills, fever, and sweats.  HENT:   Denies headaches or blurry vision.  Cardiovascular:  " Denies chest pain or irregular heart beat.  Respiratory:   Denies cough or shortness of breath.  Gastrointestinal:  Denies abdominal pain, nausea, or vomiting.  Musculoskeletal:   Denies muscle cramps.  Neurological:   Denies dizziness or focal weakness.  Psychiatric/Behavior: Normal mental status.  Hematology/Lymph:  Denies bleeding problem or easy bruising/bleeding.  Skin:    Denies rash or suspicious lesions.    Examination:    Vital Signs:    Vitals:    24 1307 24 1308   BP:  (!) 143/76   Pulse:  78   Weight: 93 kg (205 lb) 93 kg (205 lb)   Height: 6' 1" (1.854 m) 6' 1" (1.854 m)   PainSc:    2       Body mass index is 27.05 kg/m².    Constitution:   Well-developed, well nourished patient in no acute distress.  Neurological:   Alert and oriented x 3 and cooperative to examination.     Psychiatric/Behavior: Normal mental status.  Respiratory:   No shortness of breath.non labored breathing.  Cardiovascular: Regular rate and rhythm  Eyes:    Extraoccular muscles intact  Skin:    No scars, rash or suspicious lesions.    Physical Exam:     General Musculoskeletal Exam   Gait: antalgic         Left Knee Exam     Inspection   Erythema: absent  Effusion: present  Deformity: present  Bruising: absent    Tenderness   The patient tender to palpation of the condyle, MCL, medial retinaculum and medial joint line.    Crepitus   The patient has crepitus of the medial joint line.    Range of Motion   Extension: abnormal   Flexion: abnormal   0° to 135°    Tests   Meniscus   Rashad:  Medial - positive Lateral - negative  Stability   MCL - Valgus: normal (0 to 2mm)  LCL - Varus: normal (0 to 2mm)  Patella   Passive Patellar Tilt: neutral    Other   Sensation: normal    Comments:  Varus deformity    Muscle Strength   Left Lower Extremity   Quadriceps:  5/5   Hamstrin/5     Vascular Exam       Left Pulses  Dorsalis Pedis:      2+  Posterior Tibial:      2+    Right Knee     No swelling, warmth or " tenderness.    No erythema    No tenderness    Well healed scar    No instability of the knee in mid or deep flexion     Symmetrically balanced collateral ligaments throughout ROM    Active flexion 130 degrees     Active extension 0 degrees     No weakness was observed.    No atrophy    Sensation intact distally    Intact pedal pulses     A complete knee x-ray with standing 3 views was performed -of right knee.     Impressions Radiology Test   X-ray of knee was performed intact  knee implant without signs of loosening or subsidence.    Right elbow exam:   Full active range motion with flexion-extension, pronation, supination all within normal limits   Palpable olecranon spur   No erythema   No increased heat   Chronic olecranon bursal fluid collection with no evidence of infection   2+ pulses with intact sensory and motor function and normal range motion of all 5 fingers and wrist.    Imaging: X-rays ordered and images interpreted today personally by me of four views of the right knee and left knee which show moderate osteoarthritis and cartilage space narrowing with varus deformity on the medial compartment of the left knee.  Tricompartmental osteophytes left knee.  Stable well-aligned right total knee arthroplasty.  Impression:  Mild-to-moderate osteoarthritis left knee and stable well-aligned right total knee arthroplasty    X-rays ordered and images interpreted today personally by me of three views of the right elbow which show degenerative changes consistent with mild-to-moderate osteoarthrosis and an olecranon spur.  Impression:  As above         Assessment: Right elbow pain  -     X-Ray Elbow Complete 3 views Right; Future; Expected date: 05/29/2024    Left knee pain, unspecified chronicity  -     X-Ray Knee Complete 4 or More Views Left; Future; Expected date: 05/29/2024    Primary osteoarthritis of right elbow    Olecranon bursitis of right elbow    History of total knee arthroplasty, right    Primary  osteoarthritis of left knee  -     Large Joint Aspiration/Injection: L knee        Plan:  After verbal consent and sterile prep the left knee was injected with corticosteroid and lidocaine.  Patient tolerated procedure well no complications.  He will continue with physical therapy strengthening his left side due to his stroke that has affected the left side.  Nonoperative treatment regarding his left knee and right elbow.      DISCLAIMER: This note may have been dictated using voice recognition software and may contain grammatical errors.     NOTE: Consult report sent to referring provider via [employer] EMR.

## 2024-05-29 NOTE — PROCEDURES
Large Joint Aspiration/Injection: L knee    Date/Time: 5/29/2024 1:00 PM    Performed by: Manan Phillips MD  Authorized by: Manan Phillips MD    Consent Done?:  Yes (Verbal)  Indications:  Arthritis  Timeout: prior to procedure the correct patient, procedure, and site was verified    Prep: patient was prepped and draped in usual sterile fashion      Local anesthesia used?: Yes    Local anesthetic:  Lidocaine 2% without epinephrine    Details:  Needle Size:  25 G  Ultrasonic Guidance for needle placement?: No    Location:  Knee  Site:  L knee  Medications:  80 mg methylPREDNISolone acetate 80 mg/mL  Patient tolerance:  Patient tolerated the procedure well with no immediate complications